# Patient Record
Sex: FEMALE | Race: WHITE | NOT HISPANIC OR LATINO | Employment: FULL TIME | URBAN - METROPOLITAN AREA
[De-identification: names, ages, dates, MRNs, and addresses within clinical notes are randomized per-mention and may not be internally consistent; named-entity substitution may affect disease eponyms.]

---
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
## 2018-01-05 ENCOUNTER — TRANSFERRED RECORDS (OUTPATIENT)
Dept: HEALTH INFORMATION MANAGEMENT | Facility: CLINIC | Age: 18
End: 2018-01-05

## 2018-01-17 ENCOUNTER — CARE COORDINATION (OUTPATIENT)
Dept: GASTROENTEROLOGY | Facility: CLINIC | Age: 18
End: 2018-01-17

## 2018-01-17 NOTE — PROGRESS NOTES
"Care Coordination Telephone Call  GI Service and Surgical Oncology    Called patient's mother to discuss timing of MRI and she is due in February for f/u but will plan for March during her spring break from college.     She also relates that prior to Christmas Dahiana had lost about 7 pounds, but now has gained them back.  Has been having intermittent loose stools and stomach \"pain\" but has not seen blood in stools and she does have otherwise normal stools.  I told her I would discuss with Dr. Rojo and if he would like her to be seen sooner I will let them know.      Her mom thinks maybe the changes are \"just college life.\"    I have asked them to call with any additional questions or concerns and have provided my contact information.    Priti GARCIAN, HNBC, STAR-T  RN Care Coordinator  Surgical Oncology and GI service  Ph: [phone]  FAX: [phone]          "

## 2018-01-18 ENCOUNTER — CARE COORDINATION (OUTPATIENT)
Dept: GASTROENTEROLOGY | Facility: CLINIC | Age: 18
End: 2018-01-18

## 2018-03-03 ENCOUNTER — RADIANT APPOINTMENT (OUTPATIENT)
Dept: MRI IMAGING | Facility: CLINIC | Age: 18
End: 2018-03-03
Attending: INTERNAL MEDICINE
Payer: COMMERCIAL

## 2018-03-03 DIAGNOSIS — R16.0 LIVER MASS: ICD-10-CM

## 2018-03-03 NOTE — DISCHARGE INSTRUCTIONS
MRI Contrast Discharge Instructions    The IV contrast you received today will pass out of your body in your  urine. This will happen in the next 24 hours. You will not feel this process.  Your urine will not change color.    Drink at least 4 extra glasses of water or juice today (unless your doctor  has restricted your fluids). This reduces the stress on your kidneys.  You may take your regular medicines.    If you are on dialysis: It is best to have dialysis today.    If you have a reaction: Most reactions happen right away. If you have  any new symptoms after leaving the hospital (such as hives or swelling),  call your hospital at the correct number below. Or call your family doctor.  If you have breathing distress or wheezing, call 911.    Special instructions: ***    I have read and understand the above information.    Signature:______________________________________ Date:___________    Staff:__________________________________________ Date:___________     Time:__________    [place] Radiology Departments:    ___[place]: [phone]  ___[facility]: [phone]  ___[place]: [phone] ___[facility]: [phone]  ___[facility]: [phone]  ___[facility]: [phone]  ___[phone]  ___[facility]: [phone]  ___[phone]

## 2018-03-03 NOTE — LETTER
Patient:  Dahiana Puentes  :   2000  MRN:     [id number]        Ms.Emily Puentes  [address] [address]        2018    Dear ,    We are writing to inform you of your test results. These are the official results from your recent MRI.    I have reviewed this with several colleagues and radiologists. There are two issues:  1) We believe that the lesion is by far most likely to represent focal nodular hyperplasia (FNH). This is an unimportant lesion in the liver that would not require stopping of your oral contraceptives. This would not require specific further imaging.  2) The new MRI report says that the lesion is stable in size, however the measurement reported in the note is different than last time. I have reviewed this specifically and the lesion has not grown.    We will plan on getting the repeat MRI in 1 year to look at your pancreas. This will also check on the liver lesion just in case. You can continue the oral contraceptives.    Please feel free to call if you have any questions about this letter. I can be reached at [phone].    SOFIA Rojo MD  Associate Professor of Medicine  Division of Gastroenterology, Hepatology and Nutrition  [facility]      Resulted Orders   MR Abdomen w/o & w Contrast    Narrative    MRI ABDOMEN    CLINICAL HISTORY:  Liver mass    TECHNIQUE:  Images were acquired with and without intravenous contrast  through the abdomen. The following MR images were acquired: TrueFISP,  multiplanar T2 weighted, axial T1 in/out of phase, axial fat-saturated  T1, diffusion-weighted. Multiplanar T1-weighted images with fat  saturation were before contrast administration and at multiple time  points following the administration of intravenous contrast. Contrast  dose: 10mL Eovist    FINDINGS:    Comparison study: MRI 2017; CT exam 2016.    Liver: Noncirrhotic configuration. No significant hepatic steatosis or  iron  deposition. No new hepatic lesion.    Stable size and appearance of a poorly defined 2.4 x 2.0 cm lesion in  the central right hepatic lobe, with mild central T2 hyperintensity,  slightly hypointense T1 signal. This lesion demonstrates arterial  enhancement, series 8 image 25 which becomes isointense on portal  venous and delayed phases. No washout or pseudocapsule examination.  Very mild hyperintense signal on DWI. The lesion mildly  hyperintense/isointense during the hepatobiliary phase (series 22 and  23, image 28), indicating a mild degree of uptake. No intralesional  fat content.    Gallbladder: Within normal limits.    Spleen: Not enlarged. No focal lesions.    Kidneys: Rotational anomaly of the right kidney. No suspicious renal  lesions. No hydronephrosis.    Adrenal glands: Within normal limits.    Pancreas: Preserved increased precontrast T1 signal. Main pancreatic  duct is not dilated. Stable cystic lesion measuring 1.3 x 0.9 cm  (axial plane) in the pancreatic head posteriorly without suspicious  features such as septations, enhancement or solid component.  Of note,  this lesion measured 1.0 x 0.7 cm on CT 1/13/2016  Bowel: No dilated loops of bowel.    Lymph nodes: Subcentimeter retroperitoneal and mesenteric lymph nodes,  not enlarged by size criteria.    Blood vessels: No abdominal aortic aneurysm. Patent abdominal  vasculature.    Lung bases: Mild bilateral lower lobe dependent atelectasis. No  pleural effusions.    Bones and soft tissues: No suspicious bone lesions. Benign-appearing  hemangioma at the vertebral body T12.    Mesentery and abdominal wall: Within normal limits.    Ascites: No ascites.      Impression    IMPRESSION:  1. Poorly defined early enhancing lesion in the right hepatic lobe is  stable in size and shows faint uptake of contrast during the  hepatobiliary phase. Findings are most in keeping with the diagnosis  of focal nodular hyperplasia.  2. Cystic lesion at the pancreatic head  is stable when compared to  prior MRI/MRCP. However, it has slightly increased in size when  compared to prior CT performed in 2016. There are no other concerning  features and this probably represents a side branch IPMN. Follow-up  criteria below.    [facility] recommendations for asymptomatic pancreatic  cysts, modified from international consensus guidelines*   Size of largest cyst:   Less than 1 cm: Follow-up imaging in 6 months to 1 year, then lengthen  interval to 2-3 years if no change.  1-2 cm: Follow-up imaging at 6 months, then yearly for 2 years, then  lengthen interval if no change.   The optimal initial study or first follow-up exam is MRI/MRCP  performed with intravenous gadolinium contrast to allow full cyst  characterization. Once characterized, contrast is optional on  follow-up MRIs. If MRI is contraindicated, CT with contrast is  recommended.   GI consultation for possible endoscopic ultrasound is recommended for  cysts with the following features: Size > 2 cm, >20% growth on  followup, wall thickening or enhancement, mural nodule, duct > 5 mm,  or abrupt change in duct caliber with distal atrophy. GI or surgical  consultation is also recommended for symptomatic cysts.   *Reference: International Consensus Guidelines for Management of IPMN  and MCN of the pancreas. Pancreatology: 12:(2012); 183-197.    TOSHA WILHELM MD

## 2018-03-05 ENCOUNTER — OFFICE VISIT (OUTPATIENT)
Dept: GASTROENTEROLOGY | Facility: CLINIC | Age: 18
End: 2018-03-05
Attending: INTERNAL MEDICINE
Payer: COMMERCIAL

## 2018-03-05 VITALS
DIASTOLIC BLOOD PRESSURE: 70 MMHG | WEIGHT: 118.17 LBS | SYSTOLIC BLOOD PRESSURE: 119 MMHG | BODY MASS INDEX: 19.08 KG/M2 | TEMPERATURE: 99 F | OXYGEN SATURATION: 100 % | HEART RATE: 97 BPM | RESPIRATION RATE: 16 BRPM

## 2018-03-05 DIAGNOSIS — R19.5 LOOSE STOOLS: ICD-10-CM

## 2018-03-05 DIAGNOSIS — K86.2 PANCREAS CYST: Primary | ICD-10-CM

## 2018-03-05 DIAGNOSIS — R16.0 LIVER MASS: ICD-10-CM

## 2018-03-05 PROCEDURE — G0463 HOSPITAL OUTPT CLINIC VISIT: HCPCS | Mod: ZF

## 2018-03-05 ASSESSMENT — PAIN SCALES - GENERAL: PAINLEVEL: NO PAIN (0)

## 2018-03-05 NOTE — PROGRESS NOTES
SUBJECTIVE:  The patient is a 17-year-old female who is being seen today for followup of a cystic lesion in the pancreas, as well as an incidentally identified liver lesion of unknown significance.  The patient initially was found to have a cyst in her pancreas during evaluation for urologic issues which have since resolved.  She was referred to me for endoscopic ultrasound examination, which was performed by myself on 06/09/2016.  At that time she was found to have a 16 x 6 mm cyst in the head of the pancreas.  Needle aspiration was performed with a low CEA level of 2.5, amylase of 898 and negative cytology.  There were no high-risk features seen on the endoscopic ultrasound.  Subsequently, she had an MRI obtained in 08/2017.  At that time, the cyst was felt to be stable; however, incidentally, she was found to have a nonspecific lesion in the central portion of her liver which was thought to either be a hepatic adenoma or focal nodular hyperplasia or perhaps some inflammation or scarring.  She was presented at our multidisciplinary liver tumor conference, and the recommendation at that time was to have a repeat MRI with [facility].  She does take oral contraceptives, and it was felt that she could continue these.      She had an MRI with [facility] performed 2 days ago and is here to review the results.  MRI has not been officially read, but I did review with Radiology today and it is their opinion that this is by far most likely to represent focal nodular hyperplasia.  This will need to be further reviewed before final report is issued, but the thought was that this was unlikely to be a hepatic adenoma.  If it is an adenoma, it has not changed over time while the patient has continued oral contraceptives.  The pancreatic cyst also appears stable.      Again, past medical history is significant for acne and seasonal allergies.      The patient reports today that she has intermittent gastrointestinal symptoms.  She is a  "freshman in college and states that she gets abdominal bloating and loose stools during times of stress.  She does not notice blood in the stools or obvious undigested food or fat.  Her weight has been stable.  She notes that she has bloating and flatulence in response to dairy and has been able to prevent this at times by using Lactaid.  She does report she has camped intermittently and does not recall drinking stream water.  These symptoms have been present for 2-3 years or so.      OBJECTIVE:   VITAL SIGNS:  Documented in Epic.   GENERAL:  The patient is awake, alert, in no acute distress.   HEENT:  Shows no evidence of scleral icterus.   ABDOMEN:  Soft and benign.  There is no focal mass, tenderness or hepatosplenomegaly.      ASSESSMENT AND PLAN:     1.  Pancreatic cyst.  Again, this is nonspecific with reassuring imaging and low CEA; however, I cannot completely exclude the possibility that this represents either a mucinous cystic neoplasm or branch duct variant intraductal papillary mucinous neoplasm.  We again discussed the published guidelines for management.  My recommendation is that we follow this with a 1-year repeat MRI with contrast.  If that is stable at that time, guidelines would suggest a followup exam in 3 years.  I again reviewed the potential risk for malignant transformation with this lesion, but this is relatively low.  I again counseled that I would recommend that we delay more invasive evaluation at this point.  It is likely that we will have ongoing research with either improved diagnostic techniques or ablative techniques which would not require a Whipple resection which otherwise what would be required at this time.  When I see her in 1 year, we will discuss potentially performing repeat EUS with fluid analysis including Redpath tumor marker panel and possibly forceps biopsy with \"Moray\" forceps to obtain a more definitive diagnosis.   2.  Liver lesion.  If this is in fact focal nodular " hyperplasia, then it has been stable, and no further evaluation is warranted.  I think it is certainly reasonable for her to continue her oral contraceptives at this time.  I will contact her with the final MRI report and arrange for any further evaluation if indicated at that time.  At present, I anticipate no further specific followup of this lesion.   3.  Gastrointestinal symptoms.  This is likely irritable bowel syndrome, diarrhea predominant.  However, it would be reasonable to check fecal elastase to exclude pancreatic exocrine insufficiency given her pancreatic cyst.  I will also check a Giardia specific antigen.  She reports having had recent testing for celiac disease, which was normal.  We will obtain these results.  She also reports negative H. pylori testing.  We discussed dietary management of irritable bowel syndrome.  We also discussed the clinical significance and management of lactose intolerance.      We will try to synch up her followup MRIs in a schedule that would be more convenient to her over her winter break from college.  I will schedule her repeat MRI in early January.

## 2018-03-05 NOTE — LETTER
3/5/2018       RE: Dahiana Puentes  [address] [facility] [address] [address]     Dear Colleague,    Thank you for referring your patient, Dahiana Puentes, to the [facility] at [facility]. Please see a copy of my visit note below.    SUBJECTIVE:  The patient is a 17-year-old female who is being seen today for followup of a cystic lesion in the pancreas, as well as an incidentally identified liver lesion of unknown significance.  The patient initially was found to have a cyst in her pancreas during evaluation for urologic issues which have since resolved.  She was referred to me for endoscopic ultrasound examination, which was performed by myself on 06/09/2016.  At that time she was found to have a 16 x 6 mm cyst in the head of the pancreas.  Needle aspiration was performed with a low CEA level of 2.5, amylase of 898 and negative cytology.  There were no high-risk features seen on the endoscopic ultrasound.  Subsequently, she had an MRI obtained in 08/2017.  At that time, the cyst was felt to be stable; however, incidentally, she was found to have a nonspecific lesion in the central portion of her liver which was thought to either be a hepatic adenoma or focal nodular hyperplasia or perhaps some inflammation or scarring.  She was presented at our multidisciplinary liver tumor conference, and the recommendation at that time was to have a repeat MRI with [facility].  She does take oral contraceptives, and it was felt that she could continue these.      She had an MRI with [facility] performed 2 days ago and is here to review the results.  MRI has not been officially read, but I did review with Radiology today and it is their opinion that this is by far most likely to represent focal nodular hyperplasia.  This will need to be further reviewed before final report is issued, but the thought was that this was unlikely to be a hepatic adenoma.  If it is an adenoma, it has not  changed over time while the patient has continued oral contraceptives.  The pancreatic cyst also appears stable.      Again, past medical history is significant for acne and seasonal allergies.      The patient reports today that she has intermittent gastrointestinal symptoms.  She is a freshman in college and states that she gets abdominal bloating and loose stools during times of stress.  She does not notice blood in the stools or obvious undigested food or fat.  Her weight has been stable.  She notes that she has bloating and flatulence in response to dairy and has been able to prevent this at times by using Lactaid.  She does report she has camped intermittently and does not recall drinking stream water.  These symptoms have been present for 2-3 years or so.      OBJECTIVE:   VITAL SIGNS:  Documented in Epic.   GENERAL:  The patient is awake, alert, in no acute distress.   HEENT:  Shows no evidence of scleral icterus.   ABDOMEN:  Soft and benign.  There is no focal mass, tenderness or hepatosplenomegaly.      ASSESSMENT AND PLAN:     1.  Pancreatic cyst.  Again, this is nonspecific with reassuring imaging and low CEA; however, I cannot completely exclude the possibility that this represents either a mucinous cystic neoplasm or branch duct variant intraductal papillary mucinous neoplasm.  We again discussed the published guidelines for management.  My recommendation is that we follow this with a 1-year repeat MRI with contrast.  If that is stable at that time, guidelines would suggest a followup exam in 3 years.  I again reviewed the potential risk for malignant transformation with this lesion, but this is relatively low.  I again counseled that I would recommend that we delay more invasive evaluation at this point.  It is likely that we will have ongoing research with either improved diagnostic techniques or ablative techniques which would not require a Whipple resection which otherwise what would be required at  "this time.  When I see her in 1 year, we will discuss potentially performing repeat EUS with fluid analysis including Redpath tumor marker panel and possibly forceps biopsy with \"Moray\" forceps to obtain a more definitive diagnosis.   2.  Liver lesion.  If this is in fact focal nodular hyperplasia, then it has been stable, and no further evaluation is warranted.  I think it is certainly reasonable for her to continue her oral contraceptives at this time.  I will contact her with the final MRI report and arrange for any further evaluation if indicated at that time.  At present, I anticipate no further specific followup of this lesion.   3.  Gastrointestinal symptoms.  This is likely irritable bowel syndrome, diarrhea predominant.  However, it would be reasonable to check fecal elastase to exclude pancreatic exocrine insufficiency given her pancreatic cyst.  I will also check a Giardia specific antigen.  She reports having had recent testing for celiac disease, which was normal.  We will obtain these results.  She also reports negative H. pylori testing.  We discussed dietary management of irritable bowel syndrome.  We also discussed the clinical significance and management of lactose intolerance.      We will try to synch up her followup MRIs in a schedule that would be more convenient to her over her winter break from college.  I will schedule her repeat MRI in early January.     Again, thank you for allowing me to participate in the care of your patient.      Sincerely,    Raul Rojo MD      "

## 2018-03-05 NOTE — MR AVS SNAPSHOT
After Visit Summary   3/5/2018    Dahiana Puentes    MRN: [id number]           Patient Information     Date Of Birth          2000        Visit Information        Provider Department      3/5/2018 8:40 AM Raul Rojo MD [facility]        Today's Diagnoses     Pancreas cyst    -  1    Liver mass        Loose stools          Care Instructions    You will find a brief summary of your discussion and care plan from today's visit below.  Please review this information with your primary care provider.  ______________________________________________________________________    As discussed today by Dr. Rojo, we will plan the followin.  Stop at the lab today to  stool testing kit.  You can return this to any [facility] lab.     2.  Plan for repeat MRI and clinic visit with Dr. Rojo in early January.  Please call our office to arrange.      ______________________________________________________________________    It was a pleasure seeing you in clinic today - please contact us if there are any further questions about upcoming appointments that arise following today's visit.  During business hours, you may reach the Clinic Coordinator at [phone].  For urgent/emergent questions after business hours, you may reach the on-call GI Fellow by contacting the [facility]  at [phone].    Any benign/non-urgent test results are usually communicated via letter or [employer] [facility] message within 1-2 weeks after completion.  Urgent results (those that require a change in the previously-discussed care plan) are usually communicated via a phone call once available from our clinic staff to discuss the results and the next steps in your evaluation.    I recommend signing up for [employer] access if you have not already done so and are comfortable with using a computer.  This allows for online access to your lab results and also helps you communicate efficiently  with the clinic should any questions arise in your care.    My role as your RN care coordinator is to assist with any additional questions or concerns regarding your diagnosis and plan of care and to be your advocate.  I am happy to be part of your care team at the [facility].      Sincerely,      Priti Erickson  BSN, HNBC, STAR-T  RN Care Coordinator  Ph: [phone]  FAX: [phone]              Follow-ups after your visit        Follow-up notes from your care team     Return in about 9 months (around 12/5/2018) for for review of repeat MRI.      Your next 10 appointments already scheduled     Aug 20, 2018 10:00 AM CDT   (Arrive by 9:45 AM)   Return Visit with Raul Rojo MD   [facility] ([facility])    [address] [place] [id number] [phone]              Future tests that were ordered for you today     Open Future Orders        Priority Expected Expires Ordered    Pancreatic Elastase Fecal Routine  3/5/2019 3/5/2018    Giardia antigen Routine  3/5/2019 3/5/2018            Who to contact     If you have questions or need follow up information about today's clinic visit or your schedule please contact [facility] directly at [phone].  Normal or non-critical lab and imaging results will be communicated to you by MyChart, letter or phone within 4 business days after the clinic has received the results. If you do not hear from us within 7 days, please contact the clinic through MyChart or phone. If you have a critical or abnormal lab result, we will notify you by phone as soon as possible.  Submit refill requests through [employer] or call your pharmacy and they will forward the refill request to us. Please allow 3 business days for your refill to be completed.          Additional Information About Your Visit        [employer] Information     [employer] lets you send messages to your doctor, view  "your test results, renew your prescriptions, schedule appointments and more. To sign up, go to [email or web address] . Click on \"Log in\" on the left side of the screen, which will take you to the Welcome page. Then click on \"Sign up Now\" on the right side of the page.     You will be asked to enter the access code listed below, as well as some personal information. Please follow the directions to create your username and password.     Your access code is: [email or web address]  Expires: 2018  3:43 PM     Your access code will  in 90 days. If you need help or a new code, please call your [facility] clinic or [phone].        Care EveryWhere ID     This is your Care EveryWhere ID. This could be used by other organizations to access your [facility] medical records  [id number]        Your Vitals Were     Pulse Temperature Respirations Pulse Oximetry BMI (Body Mass Index)       97 99  F (37.2  C) (Tympanic) 16 100% 19.08 kg/m2        Blood Pressure from Last 3 Encounters:   18 119/70   17 122/76   16 108/57    Weight from Last 3 Encounters:   18 53.6 kg (118 lb 2.7 oz) (31 %)*   17 53.6 kg (118 lb 1.6 oz) (33 %)*   16 55.3 kg (121 lb 14.6 oz) (47 %)*     * Growth percentiles are based on [facility] 2-20 Years data.                 Today's Medication Changes          These changes are accurate as of 3/5/18  9:20 AM.  If you have any questions, ask your nurse or doctor.               Stop taking these medicines if you haven't already. Please contact your care team if you have questions.     ACZONE 5 % Gel   Generic drug:  Dapsone   Stopped by:  Raul Rojo MD           cephALEXin 500 MG ED starter pack   Commonly known as:  KEFLEX   Stopped by:  Raul Rojo MD                    Primary Care Provider Office Phone # Fax #    Bryn V MD Tim [phone]       [facility] [id number] [facility] [address]  [place] [address]        Equal Access to Services  "    SAM SHIRLEY : Hadii aad ku tori House, waaxda luqadaha, qaybta kaalmada haley, waxsylvia félix hayisabelle weekshectoravtar topete . So [facility] [phone].    ATENCIÓN: Si habla español, tiene a kyle disposición servicios gratuitos de asistencia lingüística. Llame al [phone].    We comply with applicable federal civil rights laws and [place] laws. We do not discriminate on the basis of race, color, national origin, age, disability, sex, sexual orientation, or gender identity.            Thank you!     Thank you for choosing [facility]  for your care. Our goal is always to provide you with excellent care. Hearing back from our patients is one way we can continue to improve our services. Please take a few minutes to complete the written survey that you may receive in the mail after your visit with us. Thank you!             Your Updated Medication List - Protect others around you: Learn how to safely use, store and throw away your medicines at www.disposemymeds.org.          This list is accurate as of 3/5/18  9:20 AM.  Always use your most recent med list.                   Brand Name Dispense Instructions for use Diagnosis    clindamycin 1 % topical gel    CLINDAMAX     Apply topically 2 times daily        levofloxacin 500 MG tablet    LEVAQUIN    5 tablet    Take 1 tablet (500 mg) by mouth daily    Pancreas cyst       spironolactone 25 MG tablet    ALDACTONE     Take 25 mg by mouth 2 times daily        vitamin D 2000 UNITS Caps      Take 2,000 Units by mouth daily        TAMIA PO      1 tablet Per instructions provided        ZYRTEC ALLERGY 10 MG tablet   Generic drug:  cetirizine      Take 10 mg by mouth as needed

## 2018-03-05 NOTE — PATIENT INSTRUCTIONS
You will find a brief summary of your discussion and care plan from today's visit below.  Please review this information with your primary care provider.  ______________________________________________________________________    As discussed today by Dr. Rojo, we will plan the followin.  Stop at the lab today to  stool testing kit.  You can return this to any [facility] lab.     2.  Plan for repeat MRI and clinic visit with Dr. Rojo in early January.  Please call our office to arrange.      ______________________________________________________________________    It was a pleasure seeing you in clinic today - please contact us if there are any further questions about upcoming appointments that arise following today's visit.  During business hours, you may reach the Clinic Coordinator at [phone].  For urgent/emergent questions after business hours, you may reach the on-call GI Fellow by contacting the [facility]  at [phone].    Any benign/non-urgent test results are usually communicated via letter or [employer] [facility] message within 1-2 weeks after completion.  Urgent results (those that require a change in the previously-discussed care plan) are usually communicated via a phone call once available from our clinic staff to discuss the results and the next steps in your evaluation.    I recommend signing up for [employer] access if you have not already done so and are comfortable with using a computer.  This allows for online access to your lab results and also helps you communicate efficiently with the clinic should any questions arise in your care.    My role as your RN care coordinator is to assist with any additional questions or concerns regarding your diagnosis and plan of care and to be your advocate.  I am happy to be part of your care team at the [facility].      Sincerely,      Priti Erickson  BSN, HNBC, STAR-T  RN Care Coordinator  Ph: [phone]  FAX:  [phone]

## 2018-03-05 NOTE — NURSING NOTE
"Oncology Rooming Note    March 5, 2018 8:36 AM   Dahiana Puentes is a 17 year old female who presents for:    Chief Complaint   Patient presents with     Oncology Clinic Visit     Return for Pancreatic Cyst     Initial Vitals: /70 (BP Location: Left arm, Patient Position: Sitting, Cuff Size: Adult Large)  Pulse 97  Temp 99  F (37.2  C) (Tympanic)  Resp 16  Wt 53.6 kg (118 lb 2.7 oz)  SpO2 100%  BMI 19.08 kg/m2 Estimated body mass index is 19.08 kg/(m^2) as calculated from the following:    Height as of 8/7/17: 1.676 m (5' 5.98\").    Weight as of this encounter: 53.6 kg (118 lb 2.7 oz). Body surface area is 1.58 meters squared.  No Pain (0) Comment: Data Unavailable   No LMP recorded.  Allergies reviewed: Yes  Medications reviewed: Yes    Medications: Medication refills not needed today.  Pharmacy name entered into EPIC: Data Unavailable    Clinical concerns: MRI results  Darrel was notified.    9  minutes for nursing intake (face to face time)     Michelle Esparza MA              "

## 2018-03-06 PROCEDURE — 99000 SPECIMEN HANDLING OFFICE-LAB: CPT | Performed by: INTERNAL MEDICINE

## 2018-03-06 PROCEDURE — 83520 IMMUNOASSAY QUANT NOS NONAB: CPT | Mod: 90 | Performed by: INTERNAL MEDICINE

## 2018-03-06 PROCEDURE — 87329 GIARDIA AG IA: CPT | Performed by: INTERNAL MEDICINE

## 2018-03-07 DIAGNOSIS — R19.5 LOOSE STOOLS: ICD-10-CM

## 2018-03-07 DIAGNOSIS — K86.2 PANCREAS CYST: ICD-10-CM

## 2018-03-08 LAB
G LAMBLIA AG STL QL IA: NORMAL
SPECIMEN SOURCE: NORMAL

## 2018-03-09 LAB — ELASTASE PANC STL-MCNT: >500 UG/G

## 2018-11-14 ENCOUNTER — CARE COORDINATION (OUTPATIENT)
Dept: GASTROENTEROLOGY | Facility: CLINIC | Age: 18
End: 2018-11-14

## 2018-11-14 NOTE — PROGRESS NOTES
"Care Coordination Telephone Call  GI Service and Surgical Oncology    Patient called to discuss ongoing issues with \"stomach\" and has asked for Dr. Rojo's advise.  She also has left permission on the voice mail to contact her mother with f/u planning as \"she is very busy with college.\" I have spoken with her mom and issues remain the same with diarrhea that seems to be getting worse.  She had been taking Prilosec for what she described as 'reflux\" issues that seemed to \"not really provide relief.\"  She has tried not having lactose and that didn't help.   She did see Endocrinology in [place] and all tests for gluten intolerance were normal.  Changes in diet \"don't seem to make a difference.\"  Mom describes diarrhea about 3-5 times per week, with loose stools up to 5 times per day.  Has not noted any blood in the stools, varies in color.    Will discuss with Dr. Rojo   I have asked the patient and Mom to call with any additional questions or concerns and have provided my contact information    Priti GARCIAN, HNBC, STAR-T  RN Care Coordinator  Surgical Oncology and GI service  Ph: [phone]  FAX: [phone]          "

## 2018-11-15 ENCOUNTER — CARE COORDINATION (OUTPATIENT)
Dept: GASTROENTEROLOGY | Facility: CLINIC | Age: 18
End: 2018-11-15

## 2018-11-15 NOTE — PROGRESS NOTES
Care Coordination Telephone Call  GI Service and Surgical Oncology    Called patient's mother to discuss questions below from Dr. Rojo in f/u to yesterday's phone conversation:    Please confirm no blood and no fevers. See if any recent antibiotics since last visit, and if so check C dif.     Could try metamucil 1 tbsp po daily while waiting for further evaluation. This can provide bulk and bind extra water.     11/15/18 2:30 pm call returned by mother and she has spoken with Dahiana, denies blood in stool, has not been taking antibiotics since last visit with Dr. Rojo, and temp max was 99.5 po.  I have instructed Metamucil as above and we will plan to move appointment with Dr. Rojo up to December 17th at 8 am with MRI prior.    Priti Erickson  BSN, HNBC, STAR-T  RN Care Coordinator  Surgical Oncology and GI service  Ph: [phone]  FAX: [phone]

## 2018-11-15 NOTE — TELEPHONE ENCOUNTER
Patient has been scheduled for 12/13  11:00 MRI at [address] [place] [address] St and Dr Rojo 12/17 at 8:00.  I call and left message with Mom.

## 2018-12-12 ENCOUNTER — HOSPITAL ENCOUNTER (OUTPATIENT)
Dept: MRI IMAGING | Facility: CLINIC | Age: 18
Discharge: HOME | End: 2018-12-12
Attending: INTERNAL MEDICINE | Admitting: INTERNAL MEDICINE
Payer: COMMERCIAL

## 2018-12-12 DIAGNOSIS — K86.2 PANCREAS CYST: ICD-10-CM

## 2018-12-12 PROCEDURE — 74183 MRI ABD W/O CNTR FLWD CNTR: CPT

## 2018-12-12 PROCEDURE — 25500064 ZZH RX 255 OP 636: Performed by: INTERNAL MEDICINE

## 2018-12-12 PROCEDURE — A9585 GADOBUTROL INJECTION: HCPCS | Performed by: INTERNAL MEDICINE

## 2018-12-12 RX ORDER — GADOBUTROL 604.72 MG/ML
7.5 INJECTION INTRAVENOUS ONCE
Status: COMPLETED | OUTPATIENT
Start: 2018-12-12 | End: 2018-12-12

## 2018-12-12 RX ADMIN — GADOBUTROL 5 ML: 604.72 INJECTION INTRAVENOUS at 12:09

## 2018-12-17 ENCOUNTER — TELEPHONE (OUTPATIENT)
Dept: GASTROENTEROLOGY | Facility: CLINIC | Age: 18
End: 2018-12-17

## 2018-12-17 ENCOUNTER — OFFICE VISIT (OUTPATIENT)
Dept: GASTROENTEROLOGY | Facility: CLINIC | Age: 18
End: 2018-12-17
Attending: INTERNAL MEDICINE
Payer: COMMERCIAL

## 2018-12-17 VITALS
HEIGHT: 66 IN | RESPIRATION RATE: 14 BRPM | TEMPERATURE: 98.6 F | DIASTOLIC BLOOD PRESSURE: 71 MMHG | SYSTOLIC BLOOD PRESSURE: 112 MMHG | WEIGHT: 116 LBS | HEART RATE: 80 BPM | BODY MASS INDEX: 18.64 KG/M2 | OXYGEN SATURATION: 100 %

## 2018-12-17 DIAGNOSIS — R16.0 LIVER MASS: ICD-10-CM

## 2018-12-17 DIAGNOSIS — R19.5 LOOSE STOOLS: ICD-10-CM

## 2018-12-17 DIAGNOSIS — K86.2 PANCREAS CYST: Primary | ICD-10-CM

## 2018-12-17 LAB
ALBUMIN SERPL-MCNC: 3.8 G/DL (ref 3.4–5)
BASOPHILS # BLD AUTO: 0.1 10E9/L (ref 0–0.2)
BASOPHILS NFR BLD AUTO: 0.7 %
DIFFERENTIAL METHOD BLD: NORMAL
EOSINOPHIL # BLD AUTO: 0.1 10E9/L (ref 0–0.7)
EOSINOPHIL NFR BLD AUTO: 0.7 %
ERYTHROCYTE [DISTWIDTH] IN BLOOD BY AUTOMATED COUNT: 11.3 % (ref 10–15)
ERYTHROCYTE [SEDIMENTATION RATE] IN BLOOD BY WESTERGREN METHOD: 5 MM/H (ref 0–20)
HCT VFR BLD AUTO: 41.8 % (ref 35–47)
HGB BLD-MCNC: 14.1 G/DL (ref 11.7–15.7)
IGA SERPL-MCNC: 117 MG/DL (ref 70–380)
IMM GRANULOCYTES # BLD: 0 10E9/L (ref 0–0.4)
IMM GRANULOCYTES NFR BLD: 0.4 %
LYMPHOCYTES # BLD AUTO: 2.5 10E9/L (ref 0.8–5.3)
LYMPHOCYTES NFR BLD AUTO: 32.7 %
MCH RBC QN AUTO: 30.4 PG (ref 26.5–33)
MCHC RBC AUTO-ENTMCNC: 33.7 G/DL (ref 31.5–36.5)
MCV RBC AUTO: 90 FL (ref 78–100)
MONOCYTES # BLD AUTO: 0.4 10E9/L (ref 0–1.3)
MONOCYTES NFR BLD AUTO: 5.5 %
NEUTROPHILS # BLD AUTO: 4.6 10E9/L (ref 1.6–8.3)
NEUTROPHILS NFR BLD AUTO: 60 %
NRBC # BLD AUTO: 0 10*3/UL
NRBC BLD AUTO-RTO: 0 /100
PLATELET # BLD AUTO: 337 10E9/L (ref 150–450)
RBC # BLD AUTO: 4.64 10E12/L (ref 3.8–5.2)
TSH SERPL DL<=0.005 MIU/L-ACNC: 2.38 MU/L (ref 0.4–4)
WBC # BLD AUTO: 7.7 10E9/L (ref 4–11)

## 2018-12-17 PROCEDURE — G0463 HOSPITAL OUTPT CLINIC VISIT: HCPCS | Mod: ZF

## 2018-12-17 PROCEDURE — 82040 ASSAY OF SERUM ALBUMIN: CPT | Performed by: INTERNAL MEDICINE

## 2018-12-17 PROCEDURE — 84443 ASSAY THYROID STIM HORMONE: CPT | Performed by: INTERNAL MEDICINE

## 2018-12-17 PROCEDURE — 83516 IMMUNOASSAY NONANTIBODY: CPT | Mod: 91 | Performed by: INTERNAL MEDICINE

## 2018-12-17 PROCEDURE — 85025 COMPLETE CBC W/AUTO DIFF WBC: CPT | Performed by: INTERNAL MEDICINE

## 2018-12-17 PROCEDURE — 36415 COLL VENOUS BLD VENIPUNCTURE: CPT | Performed by: INTERNAL MEDICINE

## 2018-12-17 PROCEDURE — 85652 RBC SED RATE AUTOMATED: CPT | Performed by: INTERNAL MEDICINE

## 2018-12-17 PROCEDURE — 82784 ASSAY IGA/IGD/IGG/IGM EACH: CPT | Performed by: INTERNAL MEDICINE

## 2018-12-17 PROCEDURE — 83516 IMMUNOASSAY NONANTIBODY: CPT | Performed by: INTERNAL MEDICINE

## 2018-12-17 RX ORDER — PSEUDOEPHEDRINE HCL 30 MG
TABLET ORAL EVERY 4 HOURS PRN
COMMUNITY
End: 2024-06-24

## 2018-12-17 ASSESSMENT — PAIN SCALES - GENERAL: PAINLEVEL: NO PAIN (0)

## 2018-12-17 ASSESSMENT — MIFFLIN-ST. JEOR: SCORE: 1312.92

## 2018-12-17 NOTE — NURSING NOTE
"Oncology Rooming Note    December 17, 2018 8:19 AM   Dahiana Puentes is a 18 year old female who presents for:    Chief Complaint   Patient presents with     Oncology Clinic Visit     Return Pancreatic Cyst     Initial Vitals: /71   Pulse 80   Temp 98.6  F (37  C) (Oral)   Resp 14   Ht 1.676 m (5' 6\")   Wt 52.6 kg (116 lb)   LMP 12/03/2018 (Approximate)   SpO2 100%   Breastfeeding? No   BMI 18.72 kg/m   Estimated body mass index is 18.72 kg/m  as calculated from the following:    Height as of this encounter: 1.676 m (5' 6\").    Weight as of this encounter: 52.6 kg (116 lb). Body surface area is 1.57 meters squared.  No Pain (0) Comment: Data Unavailable   Patient's last menstrual period was 12/03/2018 (approximate).  Allergies reviewed: Yes  Medications reviewed: Yes    Medications: Medication refills not needed today.  Pharmacy name entered into [facility]: [facility] - [address]    Clinical concerns: Having GI issues; possible IBS or an autoimmune issue.     6 minutes for nursing intake (face to face time)     Nadya Edwards CMA              "

## 2018-12-17 NOTE — PROGRESS NOTES
SUBJECTIVE:  The patient is a 18-year-old white female who I am seeing today for followup of an incidentally identified cystic lesion in the pancreas and liver lesion.  The pancreatic cyst was incidentally identified on abdominal imaging during an evaluation for urologic symptoms.  Subsequently, she was referred and underwent endoscopic ultrasound by myself on 06/09/2016.  This showed a single 16x6 mm cystic lesion in the pancreatic head, which was simple in appearance.  There were no solid components and there were no features of chronic pancreatitis.  Needle aspiration was performed and CEA level in the cyst fluid was 2.5 with a relatively low amylase level of 898 and benign nondiagnostic cytology.  Since that time she has undergone surveillance imaging for the possible diagnosis of intraductal papillary mucinous neoplasm or other neoplastic cysts.  She had an MRI of the abdomen in 08/2017, which showed that the cyst was stable but incidentally identified a lesion in the liver.  It was unclear on imaging whether this could represent a hepatic adenoma or focal nodular hyperplasia.  Her case was discussed in a multidisciplinary liver conference and recommendation made for MRI with Eovist.  This was performed on 03/03/2018 and at that time the lesion was felt to be most consistent with focal nodular hyperplasia.  She was taking oral contraceptives and it was felt that this was safe to continue.      She is here today for followup of a recent MRI for surveillance of the cyst and liver lesion.  This was performed on 12/12/2018.  This reported that the pancreatic cyst is unchanged in appearance.  She was incidentally noted to have pancreas divisum.  There was noted to be decreased conspicuity of the liver lesion.      At her time of her last visit, she was complaining of abdominal bloating and loose stools.  At that time we did check a stool for Giardia specific antigen as well as fecal elastase and these were normal.   "She continues to complain of these issues and states it is significantly interfering with her quality of life.  She describes episodes of loose stools, perhaps once or twice a week with 1-2 loose stools per day.  There is no blood in the stools.  She describes abdominal cramping and \"gassiness\" but does not have significant increased flatulence.  She has tried multiple dietary changes including a prolonged period of gluten-free diet without improvement.  She currently is avoiding greasy foods.  She is not having documented fevers.  She has tried Metamucil for approximately a week and a half without change in symptoms.  She feels slightly improved with Pepto-Bismol.      In addition to these symptoms she complains of dry hands and reported Raynaud's phenomenon.  She has intermittent headaches and suffers from anxiety.  She brought printed literature today regarding possible autoimmune inflammatory conditions of the GI tract and is concerned that this is somehow autoimmune.  Her mother has Raynaud phenomenon but otherwise there is no family history of celiac disease, ulcerative colitis, Crohn's disease or other autoimmune disorders.      The patient by report had serologic testing for celiac disease many years ago, but I do not have these records.      She states that her symptoms do not seem to correlate with times of stress and she is not identifying obvious precipitating factors.      OBJECTIVE:  Vital signs are documented in Epic.  The patient is awake, alert, in no acute distress.  Her abdomen shows normal bowel sounds, and is otherwise soft and benign.  There is no tenderness, mass, rebound, guarding or rigidity.  Skin shows no focal lesions in the hands.      ASSESSMENT AND PLAN:   1.  Pancreatic cyst.  The needle aspiration from the cyst was nondiagnostic, but potentially suggestive of a benign lesion such as a serous cystadenoma.  Given the low negative predictive value of needle aspiration to exclude " significant mucinous lesions, I did recommend that we continue surveillance imaging.  We discussed potential repeat testing including such tests as DNA analysis (RedPath testing) or Moray forceps biopsies; however, I am not sure that these are necessary.  In general, these can confirm a diagnosis of a clinically significant lesion but do not necessarily trigger surgical resection and negative testing would not definitively preclude the need for surveillance imaging.  After discussion, it was recommended she have repeat MRI of the abdomen in 3 years for further surveillance.  We will meet with her at that time and discuss any new literature or changes in published guidelines.   2.  Liver lesion.  This is felt to be most consistent with focal nodular hyperplasia and is stable on imaging.  I believe she can continue taking her oral contraceptives and this does not specifically require further surveillance, but will be followed at the time of her pancreatic cyst followup.   3.  Intermittent diarrhea, cramping and gassiness.  I suspect that this is irritable bowel syndrome.  Differential diagnosis would include celiac disease or inflammatory bowel disease.  She is concerned about autoimmune enteropathy which is also in the differential, but relatively uncommon.  Her dry skin could potentially also represent thyroid dysfunction.  My recommendation at this time would be to check labs including serology for celiac disease, thyroid stimulating hormone level and albumin.  We will check a complete blood count to exclude anemia.  I will plan on an endoscopy with small bowel biopsy.  At some point she may need more specific testing for autoimmune dysfunction; however, for now I will check a sedimentation rate to screen for obvious inflammatory bowel disease.  I would like ideally to have her meet one of my colleagues who focuses more on luminal gastrointestinal disorders and functional diseases of the GI tract and therefore we  will pursue a referral to Dr. Mckeon.  I will arrange for further evaluation if necessary based on the lab testing.  In the meantime, I suggested that she continue with her low-fat diet and that she could try Imodium 1 tablet a day or one-half tablet a day as needed to see if this will control her loose stools.

## 2018-12-17 NOTE — LETTER
12/17/2018       RE: Dahiana Puentes  [address] [facility] [facility] [address]     Dear Colleague,    Thank you for referring your patient, Dahiana Puentes, to the [facility] at [facility]. Please see a copy of my visit note below.    SUBJECTIVE:  The patient is a 18-year-old white female who I am seeing today for followup of an incidentally identified cystic lesion in the pancreas and liver lesion.  The pancreatic cyst was incidentally identified on abdominal imaging during an evaluation for urologic symptoms.  Subsequently, she was referred and underwent endoscopic ultrasound by myself on 06/09/2016.  This showed a single 16x6 mm cystic lesion in the pancreatic head, which was simple in appearance.  There were no solid components and there were no features of chronic pancreatitis.  Needle aspiration was performed and CEA level in the cyst fluid was 2.5 with a relatively low amylase level of 898 and benign nondiagnostic cytology.  Since that time she has undergone surveillance imaging for the possible diagnosis of intraductal papillary mucinous neoplasm or other neoplastic cysts.  She had an MRI of the abdomen in 08/2017, which showed that the cyst was stable but incidentally identified a lesion in the liver.  It was unclear on imaging whether this could represent a hepatic adenoma or focal nodular hyperplasia.  Her case was discussed in a multidisciplinary liver conference and recommendation made for MRI with Eovist.  This was performed on 03/03/2018 and at that time the lesion was felt to be most consistent with focal nodular hyperplasia.  She was taking oral contraceptives and it was felt that this was safe to continue.      She is here today for followup of a recent MRI for surveillance of the cyst and liver lesion.  This was performed on 12/12/2018.  This reported that the pancreatic cyst is unchanged in appearance.  She was incidentally noted to have  "pancreas divisum.  There was noted to be decreased conspicuity of the liver lesion.      At her time of her last visit, she was complaining of abdominal bloating and loose stools.  At that time we did check a stool for Giardia specific antigen as well as fecal elastase and these were normal.  She continues to complain of these issues and states it is significantly interfering with her quality of life.  She describes episodes of loose stools, perhaps once or twice a week with 1-2 loose stools per day.  There is no blood in the stools.  She describes abdominal cramping and \"gassiness\" but does not have significant increased flatulence.  She has tried multiple dietary changes including a prolonged period of gluten-free diet without improvement.  She currently is avoiding greasy foods.  She is not having documented fevers.  She has tried Metamucil for approximately a week and a half without change in symptoms.  She feels slightly improved with Pepto-Bismol.      In addition to these symptoms she complains of dry hands and reported Raynaud's phenomenon.  She has intermittent headaches and suffers from anxiety.  She brought printed literature today regarding possible autoimmune inflammatory conditions of the GI tract and is concerned that this is somehow autoimmune.  Her mother has Raynaud phenomenon but otherwise there is no family history of celiac disease, ulcerative colitis, Crohn's disease or other autoimmune disorders.      The patient by report had serologic testing for celiac disease many years ago, but I do not have these records.      She states that her symptoms do not seem to correlate with times of stress and she is not identifying obvious precipitating factors.      OBJECTIVE:  Vital signs are documented in Epic.  The patient is awake, alert, in no acute distress.  Her abdomen shows normal bowel sounds, and is otherwise soft and benign.  There is no tenderness, mass, rebound, guarding or rigidity.  Skin " shows no focal lesions in the hands.      ASSESSMENT AND PLAN:   1.  Pancreatic cyst.  The needle aspiration from the cyst was nondiagnostic, but potentially suggestive of a benign lesion such as a serous cystadenoma.  Given the low negative predictive value of needle aspiration to exclude significant mucinous lesions, I did recommend that we continue surveillance imaging.  We discussed potential repeat testing including such tests as DNA analysis (RedPath testing) or Moray forceps biopsies; however, I am not sure that these are necessary.  In general, these can confirm a diagnosis of a clinically significant lesion but do not necessarily trigger surgical resection and negative testing would not definitively preclude the need for surveillance imaging.  After discussion, it was recommended she have repeat MRI of the abdomen in 3 years for further surveillance.  We will meet with her at that time and discuss any new literature or changes in published guidelines.   2.  Liver lesion.  This is felt to be most consistent with focal nodular hyperplasia and is stable on imaging.  I believe she can continue taking her oral contraceptives and this does not specifically require further surveillance, but will be followed at the time of her pancreatic cyst followup.   3.  Intermittent diarrhea, cramping and gassiness.  I suspect that this is irritable bowel syndrome.  Differential diagnosis would include celiac disease or inflammatory bowel disease.  She is concerned about autoimmune enteropathy which is also in the differential, but relatively uncommon.  Her dry skin could potentially also represent thyroid dysfunction.  My recommendation at this time would be to check labs including serology for celiac disease, thyroid stimulating hormone level and albumin.  We will check a complete blood count to exclude anemia.  I will plan on an endoscopy with small bowel biopsy.  At some point she may need more specific testing for  autoimmune dysfunction; however, for now I will check a sedimentation rate to screen for obvious inflammatory bowel disease.  I would like ideally to have her meet one of my colleagues who focuses more on luminal gastrointestinal disorders and functional diseases of the GI tract and therefore we will pursue a referral to Dr. Mckeon.  I will arrange for further evaluation if necessary based on the lab testing.  In the meantime, I suggested that she continue with her low-fat diet and that she could try Imodium 1 tablet a day or one-half tablet a day as needed to see if this will control her loose stools.         Again, thank you for allowing me to participate in the care of your patient.      Sincerely,    Raul Rojo MD

## 2018-12-17 NOTE — PATIENT INSTRUCTIONS
I've included a brief summary of your discussion and care plan from today's visit below.  Please review this information with your primary care provider.  _______________________________________________________________________    As discussed by Dr. Rojo, we will proceed with the following:     - Upper endoscopy     - Labs today.     - Referral to Dr. Mckeon for management of possible IBS symptoms.      As we spoke, once we get results of the tests, we will be discussing them and making the necessary changes.   _______________________________________________________________________    It was a pleasure seeing you in clinic today - please be in touch if there are any further questions that arise following today's visit.  During business hours, you may reach my Clinic Coordinator at [phone].  For urgent/emergent questions after business hours, you may reach the on-call GI Fellow by contacting the [facility]  at [phone].    Any benign/non-urgent test results are usually communicated via letter or mxHerohart message within 1-2 weeks after completion.  Urgent results (those that require a change in the previously-discussed care plan) are usually communicated via a phone call once available from our clinic staff to discuss the results and the next steps in your evaluation.    I recommend signing up for mxHerohart access if you have not already done so and are comfortable with using a computer.  This allows for online access to your lab results and also helps you communicate efficiently with my clinic should any questions arise in your care.      Sincerely,    Lori Apple RN   Care Coordinator, Dr. Rojo

## 2018-12-18 ENCOUNTER — TELEPHONE (OUTPATIENT)
Dept: GASTROENTEROLOGY | Facility: CLINIC | Age: 18
End: 2018-12-18

## 2018-12-18 LAB
TTG IGA SER-ACNC: <1 U/ML
TTG IGG SER-ACNC: <1 U/ML

## 2019-01-16 ENCOUNTER — CARE COORDINATION (OUTPATIENT)
Dept: GASTROENTEROLOGY | Facility: CLINIC | Age: 19
End: 2019-01-16

## 2019-01-18 ENCOUNTER — TELEPHONE (OUTPATIENT)
Dept: GASTROENTEROLOGY | Facility: CLINIC | Age: 19
End: 2019-01-18

## 2019-01-18 NOTE — TELEPHONE ENCOUNTER
: [x] N/A   [] Yes:  Language /  ID:       Patient scheduled for:  [x] EGD  [] Colonoscopy  [] EUS  [] Flex Sig   [] Other:     Indication for procedure. [] Screening   [x] Loose stools     Procedure Provider:  Darrel      Referring Provider. Curtis Thorpe    Arrival time verified: Wed; 1/23/19; 0700    Facility location verified:   [x][facility] - [address] [place] [address], 1st Floor, [address]  [address] [facility], 5th floor       Prep Type:   []Golytely eRx: ;  [] MoviPrep: , [] MiraLax: , [] Other:   [x]NPO /p MN, No solid food /p 2200 the night before    Anticoagulants or blood thinners: [x]None [] ASA 81mg [] Warfarin  [] Warfarin + Lovenox bridge [] Plavix [] Effient [] Eliquis  [] Xarelto  [] Brilinta [] NSAIDS  [] Other    LAST anticoagulant dose: Date/Time:   INR:     Electronic implanted devices: [x] No  [] IPG  []  ICD  []  LVAD  []     H&P / Pre op physical completed: [x] N/A, [] Complete, Date , [] Scheduled, Date , [] No,     ______________________________________________________        Instructions given: [x] Rec d & Read   [] Reviewed   [] Resent via Room     [] Resent via eMail (see below)     Pre procedure teaching completed: [x] Yes - Reviewed, [] No,     Transportation from procedure: [x] Yes Dad, [] Pending , [] No     Additional Information: None    Christal Perry, RN  [facility]

## 2019-01-23 ENCOUNTER — HOSPITAL ENCOUNTER (OUTPATIENT)
Facility: CLINIC | Age: 19
Discharge: HOME | End: 2019-01-23
Attending: INTERNAL MEDICINE | Admitting: INTERNAL MEDICINE
Payer: COMMERCIAL

## 2019-01-23 VITALS
HEART RATE: 96 BPM | OXYGEN SATURATION: 96 % | HEIGHT: 66 IN | WEIGHT: 117 LBS | BODY MASS INDEX: 18.8 KG/M2 | SYSTOLIC BLOOD PRESSURE: 97 MMHG | DIASTOLIC BLOOD PRESSURE: 57 MMHG | RESPIRATION RATE: 17 BRPM

## 2019-01-23 PROCEDURE — 25000125 ZZHC RX 250: Performed by: INTERNAL MEDICINE

## 2019-01-23 PROCEDURE — 45380 COLONOSCOPY AND BIOPSY: CPT | Performed by: INTERNAL MEDICINE

## 2019-01-23 PROCEDURE — 43239 EGD BIOPSY SINGLE/MULTIPLE: CPT | Performed by: INTERNAL MEDICINE

## 2019-01-23 PROCEDURE — G0500 MOD SEDAT ENDO SERVICE >5YRS: HCPCS | Performed by: INTERNAL MEDICINE

## 2019-01-23 PROCEDURE — 25000128 H RX IP 250 OP 636: Performed by: INTERNAL MEDICINE

## 2019-01-23 PROCEDURE — 88305 TISSUE EXAM BY PATHOLOGIST: CPT | Performed by: INTERNAL MEDICINE

## 2019-01-23 RX ORDER — ONDANSETRON 2 MG/ML
4 INJECTION INTRAMUSCULAR; INTRAVENOUS
Status: DISCONTINUED | OUTPATIENT
Start: 2019-01-23 | End: 2019-01-23 | Stop reason: HOSPADM

## 2019-01-23 RX ORDER — ONDANSETRON 2 MG/ML
4 INJECTION INTRAMUSCULAR; INTRAVENOUS EVERY 6 HOURS PRN
Status: CANCELLED | OUTPATIENT
Start: 2019-01-23

## 2019-01-23 RX ORDER — FLUMAZENIL 0.1 MG/ML
0.2 INJECTION, SOLUTION INTRAVENOUS
Status: CANCELLED | OUTPATIENT
Start: 2019-01-23 | End: 2019-01-23

## 2019-01-23 RX ORDER — LIDOCAINE 40 MG/G
CREAM TOPICAL
Status: DISCONTINUED | OUTPATIENT
Start: 2019-01-23 | End: 2019-01-23 | Stop reason: HOSPADM

## 2019-01-23 RX ORDER — NALOXONE HYDROCHLORIDE 0.4 MG/ML
.1-.4 INJECTION, SOLUTION INTRAMUSCULAR; INTRAVENOUS; SUBCUTANEOUS
Status: CANCELLED | OUTPATIENT
Start: 2019-01-23 | End: 2019-01-24

## 2019-01-23 RX ORDER — ONDANSETRON 4 MG/1
4 TABLET, ORALLY DISINTEGRATING ORAL EVERY 6 HOURS PRN
Status: CANCELLED | OUTPATIENT
Start: 2019-01-23

## 2019-01-23 RX ORDER — FENTANYL CITRATE 50 UG/ML
INJECTION, SOLUTION INTRAMUSCULAR; INTRAVENOUS PRN
Status: DISCONTINUED | OUTPATIENT
Start: 2019-01-23 | End: 2019-01-23 | Stop reason: HOSPADM

## 2019-01-23 ASSESSMENT — MIFFLIN-ST. JEOR: SCORE: 1317.46

## 2019-01-23 NOTE — DISCHARGE INSTRUCTIONS
Discharge Instructions after  Upper Endoscopy (EGD)    Activity and Diet  You were given medicine for pain. You may be dizzy or sleepy.  For 24 hours:    Do not drive or use heavy equipment.    Do not make important decisions.    Do not drink any alcohol.  __x_ You may return to your regular diet.    Discomfort  You may have a sore throat for 2 to 3 days. It may help to:    Avoid hot liquids for 24 hours.    Use sore throat lozenges.    Gargle as needed with salt water up to 4 times a day. Mix 1 cup of warm water  with 1 teaspoon of salt. Do not swallow.      You may take Tylenol (acetaminophen) for pain unless your doctor has told you not to.    Do not take aspirin or ibuprofen (Advil, Motrin) or other NSAIDS  (anti-inflammatory drugs) for _3__ days.    Follow-up  _x__ We took small tissue samples for study. If you do not have a follow-up visit scheduled,  call your provider s office in 2 weeks for the results.    Other instructions________________________________________________________    When to call us:  Problems are rare. Call right away if you have:    Unusual throat pain or trouble swallowing    Unusual pain in belly or chest that is not relieved by belching or passing air    Black stools (tar-like looking bowel movement)    Temperature above 100.6  F. (37.5  C).    If you vomit blood or have severe pain, go to an emergency room.    If you have questions, call:  Monday to Friday, 7 a.m. to 4:30 p.m.: Endoscopy: [phone] (We may have to call you back)    After hours: Hospital: [phone] (Ask for the GI fellow on call)

## 2019-01-23 NOTE — OR NURSING
Pt had some discomfort during EGD with biopsies, extra sedation medication given. Pt otherwise tolerated procedure well. Pt stable upon transport to recovery.

## 2019-01-23 NOTE — LETTER
"    Patient:  Dahiana Kirkpatrick  :   2000  MRN:     [id number]        Ms.Emily Kirkpatrick  [address] [facility] [address]        2019    Dear ,    We are writing to inform you of your test results. The biopsies from your small intestine were normal.    Resulted Orders   Surgical pathology exam   Result Value Ref Range    Copath Report       Patient Name: DAHIANA KIRKPATRICK  MR#: [id number]  Specimen #: [id number]  Collected: 2019  Received: 2019  Reported: 2019 12:15  Ordering Phy(s): ZEYNEP DÍAZ    For improved result formatting, select 'View Enhanced Report Format' under   Linked Documents section.    SPECIMEN(S):  Duodenal biopsy    FINAL DIAGNOSIS:  DUODENUM, BIOPSY:  - Small bowel mucosa with no significant histologic change  - No evidence of celiac disease    I have personally reviewed all specimens and/or slides, including the   listed special stains, and used them  with my medical judgement to determine or confirm the final diagnosis.    Electronically signed out by:    Kevin Garcia M.D., [facility]    CLINICAL HISTORY:  The patient is a 18 year old woman presenting with diarrhea, loose stools,   bloating and dyspepsia improved  with 1/2 tab Imodium daily. Pt with history of pancreatic cyst which is   being followed conservatively. Recent  normal fecal elastase and celiac serology.  GROSS:   The specimen is received in formalin with proper patient identification,   labeled \"small intestine, duodenum\".  The specimen consists of six tan-brown pieces of irregular soft tissue   ranging 0.2-2.5 cm in greatest  dimension which are entirely submitted in cassette A1. (Dictated by:   Aubrey Lu 2019 02:58 PM)    MICROSCOPIC:  Microscopic examination was performed.    CPT Codes:  A: 39045-IM8    TESTING LAB LOCATION:  [facility], [address]   " [address]  [phone]    COLLECTION SITE:  Client: [facility] [place]  Location: GAL (B)    Resident  [id number]       Please feel free to call if you have any questions about this letter. I can be reached at [phone].    SOFIA Rojo MD  Associate Professor of Medicine  Division of Gastroenterology, Hepatology and Nutrition  [facility]

## 2019-01-24 LAB
COPATH REPORT: NORMAL
UPPER GI ENDOSCOPY: NORMAL

## 2019-04-03 NOTE — TELEPHONE ENCOUNTER
FUTURE VISIT INFORMATION      FUTURE VISIT INFORMATION:    Date: 4/17/19    Time: 11:40AM    Location: [facility]  REFERRAL INFORMATION:    Referring provider:  Dr. Raul Rojo    Referring providers clinic:  [facility]    Reason for visit/diagnosis:  IBS symptoms and Diarrhea    NOTES STATUS DETAILS   OFFICE NOTE from referring provider  Internal 12/17/18, 3/5/18   OFFICE NOTE from other specialist   N/A    DISCHARGE SUMMARY FROM HOSPITAL N/A    DISCHARGE REPORT FROM ED N/A    OPERATIVE REPORT  N/A    PFC REPORT N/A    MEDICATION LIST N/A    LABS     FIT/STOOL TESTING N/A    PERTINENT LABS Internal    PATHOLOGY REPORTS RELATED TO DIAGNOSIS Internal 1/23/19   DIAGNOSTIC PROCEDURES     COLONOSCOPY N/A    ENDOSCOPY (EGD) Internal 1/23/19   ERCP N/A    EUS Internal 6/9/16   FLEX SIGMOIDOSCOPY N/A    IMAGING & REPORT      CT, MRI, US, XR Internal

## 2019-04-15 ENCOUNTER — TELEPHONE (OUTPATIENT)
Dept: GASTROENTEROLOGY | Facility: CLINIC | Age: 19
End: 2019-04-15

## 2019-04-15 NOTE — TELEPHONE ENCOUNTER
Called and left message for patient reminding of appointment scheduled [facility]/17/19 at 1140am [facility]. Patient to arrive 15 min early. To reschedule or cancel patient to call [phone].    DANIELE Al

## 2019-04-17 ENCOUNTER — PRE VISIT (OUTPATIENT)
Dept: GASTROENTEROLOGY | Facility: CLINIC | Age: 19
End: 2019-04-17

## 2019-04-17 ENCOUNTER — OFFICE VISIT (OUTPATIENT)
Dept: GASTROENTEROLOGY | Facility: CLINIC | Age: 19
End: 2019-04-17
Payer: COMMERCIAL

## 2019-04-17 VITALS
OXYGEN SATURATION: 100 % | WEIGHT: 118.7 LBS | BODY MASS INDEX: 19.08 KG/M2 | HEIGHT: 66 IN | DIASTOLIC BLOOD PRESSURE: 65 MMHG | HEART RATE: 81 BPM | SYSTOLIC BLOOD PRESSURE: 109 MMHG | TEMPERATURE: 99.3 F

## 2019-04-17 DIAGNOSIS — R19.5 LOOSE STOOLS: ICD-10-CM

## 2019-04-17 ASSESSMENT — ENCOUNTER SYMPTOMS
RECTAL PAIN: 0
VOMITING: 0
CONSTIPATION: 0
JAUNDICE: 0
DIARRHEA: 1
NAUSEA: 0
BOWEL INCONTINENCE: 0
BLOOD IN STOOL: 0
BLOATING: 1
ABDOMINAL PAIN: 1
HEARTBURN: 1

## 2019-04-17 ASSESSMENT — PAIN SCALES - GENERAL: PAINLEVEL: NO PAIN (0)

## 2019-04-17 ASSESSMENT — MIFFLIN-ST. JEOR: SCORE: 1325.17

## 2019-04-17 NOTE — NURSING NOTE
"  Chief Complaint   Patient presents with     Consult     New consult IBS symptoms and diarrhea, referred by Dr. Rojo     Vitals:    04/17/19 1124   BP: 109/65   Pulse: 81   Temp: 99.3  F (37.4  C)   TempSrc: Oral   SpO2: 100%   Weight: 53.8 kg (118 lb 11.2 oz)   Height: 1.676 m (5' 6\")     Body mass index is 19.16 kg/m .  Tonia Cuevas CMA    "

## 2019-04-17 NOTE — LETTER
To Whom It MayConcern:       Dahiana Deloris  was seen in our clinic on 4/17/2019 for a GI appointment.          Provider Signature:         Dr. Cassi Mckeon

## 2019-04-17 NOTE — LETTER
4/17/2019       RE: Dahiana Puentes  [address] [address]     Dear Colleague,    Thank you for referring your patient, Dahiana Puentes, to the [facility] GASTRO[facility]OLOGY AND IBD CLINIC at [facility]. Please see a copy of my visit note below.    GI CLINIC VISIT    CC/REFERRING MD:  Raul Rojo  REASON FOR CONSULTATION: Loose stools   ASSESSMENT/PLAN:    Dahiana Puentes is a 18-year old female with allergic rhinitis, pancreatic cyst (non-diagnostic needle aspiration, with no concerning EUS features, possible serous cystadenoma, followed by Dr ALEIDA Rojo with plan for MRI in 3 years), anxiety, and heart palpitations. She is seen in the luminal GI clinic today for an initial consultation regarding her loose stools.     1. Loose stools   Normal [place] 4 BM each morning with no associated GI symptoms. Several times a week with afternoon/evening [place] 5-6 stools which may be accompanied by GI symptoms (urgency, cramping, bloating, increased flatus). As well as one to two episodes a month of clustered [place] 7 stools with GI symptoms. This pattern has been ongoing for several years.    Negative work-up thus far (normal CBC, hgb, MCV, TSH, celiac serologies, giardia Ag, pancreatic elastase, normal EGD with nl duodenal biopsies).     Given age of symptom onset and with near daily symptoms, IBD is a possibility. Currently not anemic with no known family hx of IBD. Would start with colonoscopy to evaluate for IBD as well as microscopic colitis. Certainly clinical picture may suggest functional bowel disease such as IBS-D.Periods of episodic-worsening and symptom-free days with improvement of symptoms after defecation.    While work-up is ongoing will trial of fiber supplementation in an effort to improve stool consistency and urgency. Ok to continue  loperamide 1/2 tab prophylactically (before events, travel, tests, etc)   .  RTC 3 months    Thank you for this  consultation.  It was a pleasure to participate in the care of this patient; please contact us with any further questions.     Reena Gillespie APRN, CNP, CWOCN  Division of Gastroenterology, Hepatology and Nutrition  [facility]    ---------------------------------------  Scribe Disclosure:   I,Reena Gillespie, am serving as a scribe; to document services personally performed by Soumya Mckeon MD- -based on data collection and the provider's statements to me.      Provider Disclosure:  I agree with above History, Review of Systems, Physical exam and Plan.  I have reviewed the content of the documentation and have edited it as needed. I have personally performed the services documented here and the documentation accurately represents those services and the decisions I have made.      It was a pleasure to participate in the care of this patient; please contact us with any further questions.  A total of 20 face-to-face minutes was spent with this patient, >50% of which was counseling regarding the above delineated issues     Electronically signed by:  Soumya Mckeon MD     --------------------------------------    HPI  Dahiana Puentes is a 18-year old female with allergic rhinitis, pancreatic cyst (non-diagnostic needle aspiration, with no concerning EUS features, possible serous cystadenoma, followed by Dr ALEIDA Rooj with plan for MRI in 3 years), anxiety, and heart palpitations. She is seen in the luminal GI clinic today for an initial consultation regarding her loose stools.     Patient is seen in clinic with her mother Haley today who provided additional history.     Ms. Puentes reports her loose stools has been ongoing for about 5-years with progressive worsening in the last two years. She is quite concerned about her unpredictable stool pattern and feels it restricts her ability to leave the house due to fear of GI symptoms.  Her current bowel pattern is one bowel movement in the morning (Cache 3-4)  with occasional 1-2 stools in the afternoon/ evening (Dor[place] 5-6).  Once or twice a month she has an episode of 2-4 [place] 7 stools with urgency.  She hasn't noted any particular trigger for the urgent [place] 7 episodes. Other symptoms associated with her loose stools includes frequent bloating, urgency, excessive flatus, abdominal cramping, and borborygmi. These symptoms  usually resolve after bowel movements. Her bloating also may improve after passing flatus. Sometimes she may have all of these symptoms, or just one at a time. She does feel that stress, anxiety and certain foods (reportedly fatty foods, dairy products, and soft serve ice cream) worsen her symptoms.     She denies blood in the stools, insecurity of passing stools, incomplete emptying, fecal incontinence, and tenesmus. She also denies nausea and vomiting.     For treatment, she tried last summer to eliminate gluten. She did this for 30 days with no notable change in the stool pattern. She currently uses imodium 1/2 tablet to help minimize the loose stools and this has been helpful.    Work-up to date includes negative giardia Ag (March 2018), normal fecal elastase (3/6/2018), and normal celiac serology (TTG IgG and IgA with a normal total IgA). An upper endoscopy on 1/23/2018 was essentially normal. Duodenal biopsies were negative for celiac sprue. Notably prior  TSH (Dec 2018) and CBC (Dec 2018) were normal    ROS:    No fevers or chills  No weight loss  No blurry vision, double vision or change in vision  No sore throat  No lymphadenopathy  No headache, paraesthesias, or weakness in a limb  No shortness of breath or wheezing  No chest pain or pressure  No arthralgias or myalgias  No rashes or skin changes  No odynophagia or dysphagia  No BRBPR, hematochezia, melena  No dysuria, frequency or urgency  No hot/cold intolerance or polyria  + anxiety or depression      PERTINENT PAST MEDICAL HISTORY:  Past Medical History:   Diagnosis Date      Allergic rhinitis      Anxiety      Chronic diarrhea      Cyst of pancreas      Dysphagia      Esophageal reflux      Heart palpitations      Other bladder disorder      Scoliosis      Stomach problems      PREVIOUS SURGERIES:  Past Surgical History:   Procedure Laterality Date     BIOPSY       COLONOSCOPY Left 1/23/2019    Procedure: EGD;  Surgeon: Raul Rojo MD;  Location: [facility] GI     COLONOSCOPY N/A 5/14/2019    Procedure: COLONOSCOPY, WITH POLYPECTOMY AND BIOPSY;  Surgeon: Soumya Mckeon MD;  Location: [facility] OR     ENDOSCOPIC ULTRASOUND UPPER GASTROINTESTINAL TRACT (GI) N/A 6/9/2016    Procedure: ENDOSCOPIC ULTRASOUND, ESOPHAGOSCOPY / UPPER GASTROINTESTINAL TRACT (GI);  Surgeon: Raul Rojo MD;  Location: [facility] OR     PREVIOUS ENDOSCOPY:  Upper Endoscopy 1/23/2019    ALLERGIES:     Allergies   Allergen Reactions     Penicillins Hives and Rash     Sulfa Drugs Hives and Rash     PERTINENT MEDICATIONS:    Current Outpatient Medications:      cetirizine (ZYRTEC ALLERGY) 10 MG tablet, Take 10 mg by mouth as needed, Disp: , Rfl:      Cholecalciferol (VITAMIN D) 2000 UNITS CAPS, Take 2,000 Units by mouth daily, Disp: , Rfl:      clindamycin (CLINDAMAX) 1 % gel, Apply topically 2 times daily, Disp: , Rfl:      Drospirenone-Ethinyl Estradiol (TAMIA PO), 1 tablet Per instructions provided, Disp: , Rfl:      psyllium (METAMUCIL) 28.3 % POWD, , Disp: , Rfl:      spironolactone (ALDACTONE) 25 MG tablet, Take 25 mg by mouth 2 times daily, Disp: , Rfl:      bisacodyl (DULCOLAX) 5 MG EC tablet, Take 2 tablets (10 mg) by mouth daily as needed for constipation, Disp: 4 tablet, Rfl: 0     pseudoePHEDrine (SUDAFED) 30 MG tablet, Take by mouth every 4 hours as needed for congestion, Disp: , Rfl:     SOCIAL HISTORY:  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   Tobacco use None   Alcohol use: None   illicit drug use: None  Social History     Socioeconomic History     Marital status: Single      "Spouse name: Not on file     Number of children: Not on file     Years of education: Not on file     Highest education level: Not on file   Occupational History     Not on file   Social Needs     Financial resource strain: Not on file     Food insecurity:     Worry: Not on file     Inability: Not on file     Transportation needs:     Medical: Not on file     Non-medical: Not on file   Tobacco Use     Smoking status: Never Smoker     Smokeless tobacco: Never Used   Substance and Sexual Activity     Alcohol use: Never     Alcohol/week: 0.0 oz     Drug use: Never     Sexual activity: Never     Comment: not sexually active   Lifestyle     Physical activity:     Days per week: Not on file     Minutes per session: Not on file     Stress: Not on file   Relationships     Social connections:     Talks on phone: Not on file     Gets together: Not on file     Attends [employer] service: Not on file     Active member of club or organization: Not on file     Attends meetings of clubs or organizations: Not on file     Relationship status: Not on file     Intimate partner violence:     Fear of current or ex partner: Not on file     Emotionally abused: Not on file     Physically abused: Not on file     Forced sexual activity: Not on file   Other Topics Concern     Parent/sibling w/ CABG, MI or angioplasty before 65F 55M? Not Asked   Social History Narrative     Not on file       FAMILY HISTORY:  FH of CRC:None   FH of IBD:None   Grandmother and aunt with IBS-D      PHYSICAL EXAMINATION:  Constitutional: aaox3, cooperative, pleasant, not dyspneic/diaphoretic, no acute distress  Vitals reviewed: /65   Pulse 81   Temp 99.3  F (37.4  C) (Oral)   Ht 1.676 m (5' 6\")   Wt 53.8 kg (118 lb 11.2 oz)   LMP 04/14/2019   SpO2 100%   BMI 19.16 kg/m     Wt:   Wt Readings from Last 2 Encounters:   05/14/19 53.5 kg (118 lb)   04/17/19 53.8 kg (118 lb 11.2 oz)   Eyes: Sclera anicteric/injected  Ears/nose/mouth/throat: Normal oropharynx " without ulcers or exudate, mucus membranes moist, hearing intact  Neck: supple, no submandibular, supraclavicular LAD  CV: RRR, nl S1, S2  Respiratory: clear on anterior exam  Abd:  Nondistended, +bs, nontender  Skin: warm, perfused, no jaundice  Psych: Normal affect  MSK: Normal gait    PERTINENT STUDIES:    Orders Only on 12/17/2018   Component Date Value Ref Range Status     Albumin 12/17/2018 3.8  3.4 - 5.0 g/dL Final     IGA 12/17/2018 117  70 - 380 mg/dL Final     Tissue Transglutaminase Antibody I* 12/17/2018 <1  <7 U/mL Final     Tissue Transglutaminase Tari IgG 12/17/2018 <1  <7 U/mL Final     TSH 12/17/2018 2.38  0.40 - 4.00 mU/L Final     Sed Rate 12/17/2018 5  0 - 20 mm/h Final     WBC 12/17/2018 7.7  4.0 - 11.0 10e9/L Final     RBC Count 12/17/2018 4.64  3.8 - 5.2 10e12/L Final     Hemoglobin 12/17/2018 14.1  11.7 - 15.7 g/dL Final     Hematocrit 12/17/2018 41.8  35.0 - 47.0 % Final     MCV 12/17/2018 90  78 - 100 fl Final     MCH 12/17/2018 30.4  26.5 - 33.0 pg Final     MCHC 12/17/2018 33.7  31.5 - 36.5 g/dL Final     RDW 12/17/2018 11.3  10.0 - 15.0 % Final     Platelet Count 12/17/2018 337  150 - 450 10e9/L Final     Diff Method 12/17/2018 Automated Method   Final     % Neutrophils 12/17/2018 60.0  % Final     % Lymphocytes 12/17/2018 32.7  % Final     % Monocytes 12/17/2018 5.5  % Final     % Eosinophils 12/17/2018 0.7  % Final     % Basophils 12/17/2018 0.7  % Final     % Immature Granulocytes 12/17/2018 0.4  % Final     Nucleated RBCs 12/17/2018 0  0 /100 Final     Absolute Neutrophil 12/17/2018 4.6  1.6 - 8.3 10e9/L Final     Absolute Lymphocytes 12/17/2018 2.5  0.8 - 5.3 10e9/L Final     Absolute Monocytes 12/17/2018 0.4  0.0 - 1.3 10e9/L Final     Absolute Eosinophils 12/17/2018 0.1  0.0 - 0.7 10e9/L Final     Absolute Basophils 12/17/2018 0.1  0.0 - 0.2 10e9/L Final     Abs Immature Granulocytes 12/17/2018 0.0  0 - 0.4 10e9/L Final     Absolute Nucleated RBC 12/17/2018 0.0   Final        Soumya Mckeon MD

## 2019-04-17 NOTE — PATIENT INSTRUCTIONS
It was a pleasure taking care of you today.  I've included a brief summary of our discussion and care plan from today's visit below.  Please review this information with your primary care provider.  ______________________________________________________________________    My recommendations are summarized as follows:    --Will schedule for colonsocopy  --Fiber supplementation metrocellulose 3-4 capsules daily  or Metamucil start with 1 tablespoon per day and if tolerated, may increase up to 2-3 tablespoons per day.  You may experience some bloating with initiation of fiber supplementation that will improve over the first month.  A good fiber trial to evaluate the effect is 3-6 months.  --Imodium 1/2 tab prophalytically as needed to prevent loose stools     Return to GI Clinic in 3 months to review your progress with Annette Portillo PA-C   ______________________________________________________________________    Who do I call with any questions after my visit?  Please be in touch if there are any further questions that arise following today's visit.  There are multiple ways to contact your gastroenterology care team.        During business hours, you may reach a Gastroenterology nurse at [phone]      To schedule or reschedule an appointment, please call [phone].       You can always send a secure message through [employer].  [employer] messages are answered by your nurse or doctor typically within 24 hours.  Please allow extra time on weekends and holidays.        For urgent/emergent questions after business hours, you may reach the on-call GI Fellow by contacting the [facility] at [phone].     How will I get the results of any tests ordered?    You will receive all of your results.  If you have signed up for [employer], any tests ordered at your visit will be available to you after your physician reviews them.  Typically this takes 1-2 weeks.  If there are urgent results that require a  change in your care plan, your physician or nurse will call you to discuss the next steps.      What is [employer]?  [employer] is a secure way for you to access all of your healthcare records from the [facility].  It is a web based computer program, so you can sign on to it from any location.  It also allows you to send secure messages to your care team.  I recommend signing up for [employer]orap access if you have not already done so and are comfortable with using a computer.      How to I schedule a follow-up visit?  If you did not schedule a follow-up visit today, please call [phone] to schedule a follow-up office visit.        Sincerely,  Reena CLARK, CNP  Division of Gastroenterology, Hepatology & Nutrition  [facility]

## 2019-04-17 NOTE — PROGRESS NOTES
GI CLINIC VISIT    CC/REFERRING MD:  Raul Rojo  REASON FOR CONSULTATION: Loose stools   ASSESSMENT/PLAN:    Dahiana Puentes is a 18-year old female with allergic rhinitis, pancreatic cyst (non-diagnostic needle aspiration, with no concerning EUS features, possible serous cystadenoma, followed by Dr ALEIDA Rojo with plan for MRI in 3 years), anxiety, and heart palpitations. She is seen in the luminal GI clinic today for an initial consultation regarding her loose stools.     1. Loose stools   Normal Appling 4 BM each morning with no associated GI symptoms. Several times a week with afternoon/evening Appling 5-6 stools which may be accompanied by GI symptoms (urgency, cramping, bloating, increased flatus). As well as one to two episodes a month of clustered Appling 7 stools with GI symptoms. This pattern has been ongoing for several years.    Negative work-up thus far (normal CBC, hgb, MCV, TSH, celiac serologies, giardia Ag, pancreatic elastase, normal EGD with nl duodenal biopsies).     Given age of symptom onset and with near daily symptoms, IBD is a possibility. Currently not anemic with no known family hx of IBD. Would start with colonoscopy to evaluate for IBD as well as microscopic colitis. Certainly clinical picture may suggest functional bowel disease such as IBS-D.Periods of episodic-worsening and symptom-free days with improvement of symptoms after defecation.    While work-up is ongoing will trial of fiber supplementation in an effort to improve stool consistency and urgency. Ok to continue  loperamide 1/2 tab prophylactically (before events, travel, tests, etc)   .  RTC 3 months    Thank you for this consultation.  It was a pleasure to participate in the care of this patient; please contact us with any further questions.       Reena Gillespie APRN, CNP, CWOCN  Division of Gastroenterology, Hepatology and Nutrition  [facility]    ---------------------------------------  Scribe Disclosure:    I,Reena Gillespie, am serving as a scribe; to document services personally performed by Soumya Mckeon MD- -based on data collection and the provider's statements to me.      Provider Disclosure:  I agree with above History, Review of Systems, Physical exam and Plan.  I have reviewed the content of the documentation and have edited it as needed. I have personally performed the services documented here and the documentation accurately represents those services and the decisions I have made.      It was a pleasure to participate in the care of this patient; please contact us with any further questions.  A total of 20 face-to-face minutes was spent with this patient, >50% of which was counseling regarding the above delineated issues     Electronically signed by:  Soumya Mckeon MD     --------------------------------------      HPI  Dahiana Puentes is a 18-year old female with allergic rhinitis, pancreatic cyst (non-diagnostic needle aspiration, with no concerning EUS features, possible serous cystadenoma, followed by Dr ALEIDA Rojo with plan for MRI in 3 years), anxiety, and heart palpitations. She is seen in the luminal GI clinic today for an initial consultation regarding her loose stools.     Patient is seen in clinic with her mother Haley today who provided additional history.     Ms. Puentes reports her loose stools has been ongoing for about 5-years with progressive worsening in the last two years. She is quite concerned about her unpredictable stool pattern and feels it restricts her ability to leave the house due to fear of GI symptoms.  Her current bowel pattern is one bowel movement in the morning (Houston 3-4) with occasional 1-2 stools in the afternoon/ evening (Houston 5-6).  Once or twice a month she has an episode of 2-4 Houston 7 stools with urgency.  She hasn't noted any particular trigger for the urgent Houston 7 episodes. Other symptoms associated with her loose stools includes frequent  bloating, urgency, excessive flatus, abdominal cramping, and borborygmi. These symptoms  usually resolve after bowel movements. Her bloating also may improve after passing flatus. Sometimes she may have all of these symptoms, or just one at a time. She does feel that stress, anxiety and certain foods (reportedly fatty foods, dairy products, and soft serve ice cream) worsen her symptoms.     She denies blood in the stools, insecurity of passing stools, incomplete emptying, fecal incontinence, and tenesmus. She also denies nausea and vomiting.     For treatment, she tried last summer to eliminate gluten. She did this for 30 days with no notable change in the stool pattern. She currently uses imodium 1/2 tablet to help minimize the loose stools and this has been helpful.    Work-up to date includes negative giardia Ag (March 2018), normal fecal elastase (3/6/2018), and normal celiac serology (TTG IgG and IgA with a normal total IgA). An upper endoscopy on 1/23/2018 was essentially normal. Duodenal biopsies were negative for celiac sprue. Notably prior  TSH (Dec 2018) and CBC (Dec 2018) were normal    ROS:    No fevers or chills  No weight loss  No blurry vision, double vision or change in vision  No sore throat  No lymphadenopathy  No headache, paraesthesias, or weakness in a limb  No shortness of breath or wheezing  No chest pain or pressure  No arthralgias or myalgias  No rashes or skin changes  No odynophagia or dysphagia  No BRBPR, hematochezia, melena  No dysuria, frequency or urgency  No hot/cold intolerance or polyria  + anxiety or depression      PERTINENT PAST MEDICAL HISTORY:  Past Medical History:   Diagnosis Date     Allergic rhinitis      Anxiety      Chronic diarrhea      Cyst of pancreas      Dysphagia      Esophageal reflux      Heart palpitations      Other bladder disorder      Scoliosis      Stomach problems      PREVIOUS SURGERIES:  Past Surgical History:   Procedure Laterality Date     BIOPSY        COLONOSCOPY Left 1/23/2019    Procedure: EGD;  Surgeon: Raul Rojo MD;  Location: [facility]     COLONOSCOPY N/A 5/14/2019    Procedure: COLONOSCOPY, WITH POLYPECTOMY AND BIOPSY;  Surgeon: Soumya Mckeon MD;  Location:  OR     ENDOSCOPIC ULTRASOUND UPPER GASTROINTESTINAL TRACT (GI) N/A 6/9/2016    Procedure: ENDOSCOPIC ULTRASOUND, ESOPHAGOSCOPY / UPPER GASTROINTESTINAL TRACT (GI);  Surgeon: Raul Rojo MD;  Location: [facility]     PREVIOUS ENDOSCOPY:  Upper Endoscopy 1/23/2019    ALLERGIES:     Allergies   Allergen Reactions     Penicillins Hives and Rash     Sulfa Drugs Hives and Rash     PERTINENT MEDICATIONS:    Current Outpatient Medications:      cetirizine (ZYRTEC ALLERGY) 10 MG tablet, Take 10 mg by mouth as needed, Disp: , Rfl:      Cholecalciferol (VITAMIN D) 2000 UNITS CAPS, Take 2,000 Units by mouth daily, Disp: , Rfl:      clindamycin (CLINDAMAX) 1 % gel, Apply topically 2 times daily, Disp: , Rfl:      Drospirenone-Ethinyl Estradiol (TAMIA PO), 1 tablet Per instructions provided, Disp: , Rfl:      psyllium (METAMUCIL) 28.3 % POWD, , Disp: , Rfl:      spironolactone (ALDACTONE) 25 MG tablet, Take 25 mg by mouth 2 times daily, Disp: , Rfl:      bisacodyl (DULCOLAX) 5 MG EC tablet, Take 2 tablets (10 mg) by mouth daily as needed for constipation, Disp: 4 tablet, Rfl: 0     pseudoePHEDrine (SUDAFED) 30 MG tablet, Take by mouth every 4 hours as needed for congestion, Disp: , Rfl:     SOCIAL HISTORY:  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   Tobacco use None   Alcohol use: None   illicit drug use: None  Social History     Socioeconomic History     Marital status: Single     Spouse name: Not on file     Number of children: Not on file     Years of education: Not on file     Highest education level: Not on file   Occupational History     Not on file   Social Needs     Financial resource strain: Not on file     Food insecurity:     Worry: Not on file     Inability: Not on  "file     Transportation needs:     Medical: Not on file     Non-medical: Not on file   Tobacco Use     Smoking status: Never Smoker     Smokeless tobacco: Never Used   Substance and Sexual Activity     Alcohol use: Never     Alcohol/week: 0.0 oz     Drug use: Never     Sexual activity: Never     Comment: not sexually active   Lifestyle     Physical activity:     Days per week: Not on file     Minutes per session: Not on file     Stress: Not on file   Relationships     Social connections:     Talks on phone: Not on file     Gets together: Not on file     Attends Cheondoism service: Not on file     Active member of club or organization: Not on file     Attends meetings of clubs or organizations: Not on file     Relationship status: Not on file     Intimate partner violence:     Fear of current or ex partner: Not on file     Emotionally abused: Not on file     Physically abused: Not on file     Forced sexual activity: Not on file   Other Topics Concern     Parent/sibling w/ CABG, MI or angioplasty before 65F 55M? Not Asked   Social History Narrative     Not on file       FAMILY HISTORY:  FH of CRC:None   FH of IBD:None   Grandmother and aunt with IBS-D      PHYSICAL EXAMINATION:  Constitutional: aaox3, cooperative, pleasant, not dyspneic/diaphoretic, no acute distress  Vitals reviewed: /65   Pulse 81   Temp 99.3  F (37.4  C) (Oral)   Ht 1.676 m (5' 6\")   Wt 53.8 kg (118 lb 11.2 oz)   LMP 04/14/2019   SpO2 100%   BMI 19.16 kg/m    Wt:   Wt Readings from Last 2 Encounters:   05/14/19 53.5 kg (118 lb)   04/17/19 53.8 kg (118 lb 11.2 oz)   Eyes: Sclera anicteric/injected  Ears/nose/mouth/throat: Normal oropharynx without ulcers or exudate, mucus membranes moist, hearing intact  Neck: supple, no submandibular, supraclavicular LAD  CV: RRR, nl S1, S2  Respiratory: clear on anterior exam  Abd:  Nondistended, +bs, nontender  Skin: warm, perfused, no jaundice  Psych: Normal affect  MSK: Normal gait    PERTINENT " STUDIES:    Orders Only on 12/17/2018   Component Date Value Ref Range Status     Albumin 12/17/2018 3.8  3.4 - 5.0 g/dL Final     IGA 12/17/2018 117  70 - 380 mg/dL Final     Tissue Transglutaminase Antibody I* 12/17/2018 <1  <7 U/mL Final     Tissue Transglutaminase Tari IgG 12/17/2018 <1  <7 U/mL Final     TSH 12/17/2018 2.38  0.40 - 4.00 mU/L Final     Sed Rate 12/17/2018 5  0 - 20 mm/h Final     WBC 12/17/2018 7.7  4.0 - 11.0 10e9/L Final     RBC Count 12/17/2018 4.64  3.8 - 5.2 10e12/L Final     Hemoglobin 12/17/2018 14.1  11.7 - 15.7 g/dL Final     Hematocrit 12/17/2018 41.8  35.0 - 47.0 % Final     MCV 12/17/2018 90  78 - 100 fl Final     MCH 12/17/2018 30.4  26.5 - 33.0 pg Final     MCHC 12/17/2018 33.7  31.5 - 36.5 g/dL Final     RDW 12/17/2018 11.3  10.0 - 15.0 % Final     Platelet Count 12/17/2018 337  150 - 450 10e9/L Final     Diff Method 12/17/2018 Automated Method   Final     % Neutrophils 12/17/2018 60.0  % Final     % Lymphocytes 12/17/2018 32.7  % Final     % Monocytes 12/17/2018 5.5  % Final     % Eosinophils 12/17/2018 0.7  % Final     % Basophils 12/17/2018 0.7  % Final     % Immature Granulocytes 12/17/2018 0.4  % Final     Nucleated RBCs 12/17/2018 0  0 /100 Final     Absolute Neutrophil 12/17/2018 4.6  1.6 - 8.3 10e9/L Final     Absolute Lymphocytes 12/17/2018 2.5  0.8 - 5.3 10e9/L Final     Absolute Monocytes 12/17/2018 0.4  0.0 - 1.3 10e9/L Final     Absolute Eosinophils 12/17/2018 0.1  0.0 - 0.7 10e9/L Final     Absolute Basophils 12/17/2018 0.1  0.0 - 0.2 10e9/L Final     Abs Immature Granulocytes 12/17/2018 0.0  0 - 0.4 10e9/L Final     Absolute Nucleated RBC 12/17/2018 0.0   Final     Answers for HPI/ROS submitted by the patient on 4/17/2019   General Symptoms: No  Skin Symptoms: No  HENT Symptoms: No  EYE SYMPTOMS: No  HEART SYMPTOMS: No  LUNG SYMPTOMS: No  INTESTINAL SYMPTOMS: Yes  URINARY SYMPTOMS: No  GYNECOLOGIC SYMPTOMS: No  BREAST SYMPTOMS: No  SKELETAL SYMPTOMS: No  BLOOD  SYMPTOMS: No  NERVOUS SYSTEM SYMPTOMS: No  MENTAL HEALTH SYMPTOMS: No  Heart burn or indigestion: Yes  Nausea: No  Vomiting: No  Abdominal pain: Yes  Bloating: Yes  Constipation: No  Diarrhea: Yes  Blood in stool: No  Black stools: No  Rectal or Anal pain: No  Fecal incontinence: No  Yellowing of skin or eyes: No  Vomit with blood: No  Change in stools: Yes

## 2019-04-18 ENCOUNTER — TELEPHONE (OUTPATIENT)
Dept: GASTROENTEROLOGY | Facility: CLINIC | Age: 19
End: 2019-04-18

## 2019-04-18 NOTE — TELEPHONE ENCOUNTER
Called patient and talked to her about the colonoscopy.  Order was cancelled and then placed back into chart. Patient was transferred to endoscopy to schedule.

## 2019-04-18 NOTE — TELEPHONE ENCOUNTER
[facility] Call Center    Phone Message    May a detailed message be left on voicemail: yes    Reason for Call: Other: Pt called in today and was trying to get scheduled for her colonoscopy, but there is no order in her chart. PLease reach out to pt to get this scheduled or if possible place referral in chart so pt can call in to be scheduled. Thank you.     Action Taken: Message routed to:  [facility] Gastro

## 2019-05-07 ENCOUNTER — TELEPHONE (OUTPATIENT)
Dept: GASTROENTEROLOGY | Facility: CLINIC | Age: 19
End: 2019-05-07

## 2019-05-07 DIAGNOSIS — R19.5 LOOSE STOOLS: Primary | ICD-10-CM

## 2019-05-07 RX ORDER — BISACODYL 5 MG/1
10 TABLET, DELAYED RELEASE ORAL DAILY PRN
Qty: 4 TABLET | Refills: 0 | Status: SHIPPED | OUTPATIENT
Start: 2019-05-07 | End: 2019-06-12

## 2019-05-07 NOTE — TELEPHONE ENCOUNTER
: [x] N/A   [] Yes:  Language /  ID:       Patient scheduled for:  [] EGD  [x] Colonoscopy  [] EUS  [] Flex Sig   [] Other:     Indication for procedure. [] Screening   [x] Loose stools     Sedation Type: [x] Conscious Sedation   [] MAC    Procedure Provider:  Mike      Referring Provider. Caryn Rojo Ebba, Jeneva; Bryn Dumont    Arrival time verified: Tues; 5/14/19; 0800    Facility location verified:   [][facility] - [address], 1st Floor, [phone]  [x][address], 5th floor       Prep Type:   [x]Golytely eRx: [facility] - [address];  [] MoviPrep: , [] MiraLax: , [] Other:   []NPO /p MN, No solid food /p 2200 the night before    Anticoagulants or blood thinners: [x]None [] ASA 81mg  - may continue [] Warfarin  [] Warfarin + Lovenox bridge [] Plavix [] Effient [] Eliquis [] Xarelto  [] Brilinta [] NSAIDS  [] Other    LAST anticoagulant dose: Date/Time:   INR:     Electronic implanted devices: [x] No  [] IPG  []  ICD  []  LVAD  []     H&P / Pre op physical completed: [x] N/A, [] Complete, Date , [] Scheduled, Date , [] No,     Additional Information: EGD 1/23/19 (C/S)    _______________________________________________      Instructions given: [x] Rec d & Read   [] Reviewed         [] Resent via Motion Engine     [] Resent via eMail (see below)     Pre procedure teaching completed: [x] Yes - Reviewed, [] No,     [x] No questions regarding Sedation as ordered, []     Transportation from procedure & responsible adult to be with patient following procedure for a minimum of 6 hrs (Conscious Sedation) 24 hrs (MAC): [x] Yes Father - confirmed will have post-procedure companionship as required, [] Pending , [] No     Christal Perry RN  [facility] Endoscopy

## 2019-05-10 ENCOUNTER — PATIENT OUTREACH (OUTPATIENT)
Dept: GASTROENTEROLOGY | Facility: CLINIC | Age: 19
End: 2019-05-10

## 2019-05-10 ENCOUNTER — TELEPHONE (OUTPATIENT)
Dept: GASTROENTEROLOGY | Facility: CLINIC | Age: 19
End: 2019-05-10

## 2019-05-10 DIAGNOSIS — R19.5 LOOSE STOOLS: Primary | ICD-10-CM

## 2019-05-10 DIAGNOSIS — Z12.11 SPECIAL SCREENING FOR MALIGNANT NEOPLASMS, COLON: Primary | ICD-10-CM

## 2019-05-10 RX ORDER — ONDANSETRON 4 MG/1
4 TABLET, FILM COATED ORAL ONCE
Qty: 2 TABLET | Refills: 0 | Status: SHIPPED | OUTPATIENT
Start: 2019-05-10 | End: 2019-05-10

## 2019-05-10 RX ORDER — ONDANSETRON 4 MG/1
TABLET, FILM COATED ORAL
Qty: 3 TABLET | Refills: 0 | Status: SHIPPED | OUTPATIENT
Start: 2019-05-10 | End: 2019-05-10

## 2019-05-10 NOTE — TELEPHONE ENCOUNTER
Patients mom called to request anti nausea medication to for upcoming Colonoscopy prep. Consulted with provider performing the exam and she ordered oral zofran tablets, 1 tablet taken an hour before consuming the prep. Call placed to gita De Leon of patient to confirm pharmacy to e script the prescription. [place] pharmacy was chosen.

## 2019-05-14 ENCOUNTER — HOSPITAL ENCOUNTER (OUTPATIENT)
Facility: AMBULATORY SURGERY CENTER | Age: 19
End: 2019-05-14
Attending: INTERNAL MEDICINE
Payer: COMMERCIAL

## 2019-05-14 VITALS
WEIGHT: 118 LBS | TEMPERATURE: 97.9 F | OXYGEN SATURATION: 100 % | SYSTOLIC BLOOD PRESSURE: 110 MMHG | RESPIRATION RATE: 16 BRPM | DIASTOLIC BLOOD PRESSURE: 65 MMHG | HEART RATE: 97 BPM | HEIGHT: 66 IN | BODY MASS INDEX: 18.96 KG/M2

## 2019-05-14 LAB
COLONOSCOPY: NORMAL
HCG UR QL: NEGATIVE
INTERNAL QC OK POCT: YES

## 2019-05-14 RX ORDER — FENTANYL CITRATE 50 UG/ML
INJECTION, SOLUTION INTRAMUSCULAR; INTRAVENOUS PRN
Status: DISCONTINUED | OUTPATIENT
Start: 2019-05-14 | End: 2019-05-14 | Stop reason: HOSPADM

## 2019-05-14 RX ORDER — ONDANSETRON 2 MG/ML
4 INJECTION INTRAMUSCULAR; INTRAVENOUS EVERY 6 HOURS PRN
Status: DISCONTINUED | OUTPATIENT
Start: 2019-05-14 | End: 2019-05-15 | Stop reason: HOSPADM

## 2019-05-14 RX ORDER — NALOXONE HYDROCHLORIDE 0.4 MG/ML
.1-.4 INJECTION, SOLUTION INTRAMUSCULAR; INTRAVENOUS; SUBCUTANEOUS
Status: DISCONTINUED | OUTPATIENT
Start: 2019-05-14 | End: 2019-05-15 | Stop reason: HOSPADM

## 2019-05-14 RX ORDER — LIDOCAINE 40 MG/G
CREAM TOPICAL
Status: DISCONTINUED | OUTPATIENT
Start: 2019-05-14 | End: 2019-05-14 | Stop reason: HOSPADM

## 2019-05-14 RX ORDER — SIMETHICONE
LIQUID (ML) MISCELLANEOUS PRN
Status: DISCONTINUED | OUTPATIENT
Start: 2019-05-14 | End: 2019-05-14 | Stop reason: HOSPADM

## 2019-05-14 RX ORDER — FLUMAZENIL 0.1 MG/ML
0.2 INJECTION, SOLUTION INTRAVENOUS
Status: ACTIVE | OUTPATIENT
Start: 2019-05-14 | End: 2019-05-14

## 2019-05-14 RX ORDER — ONDANSETRON 4 MG/1
4 TABLET, ORALLY DISINTEGRATING ORAL EVERY 6 HOURS PRN
Status: DISCONTINUED | OUTPATIENT
Start: 2019-05-14 | End: 2019-05-15 | Stop reason: HOSPADM

## 2019-05-14 RX ORDER — ONDANSETRON 2 MG/ML
4 INJECTION INTRAMUSCULAR; INTRAVENOUS
Status: DISCONTINUED | OUTPATIENT
Start: 2019-05-14 | End: 2019-05-14 | Stop reason: HOSPADM

## 2019-05-14 ASSESSMENT — MIFFLIN-ST. JEOR: SCORE: 1321.99

## 2019-05-15 LAB — COPATH REPORT: NORMAL

## 2019-06-04 ENCOUNTER — TELEPHONE (OUTPATIENT)
Dept: GASTROENTEROLOGY | Facility: CLINIC | Age: 19
End: 2019-06-04

## 2019-06-10 ASSESSMENT — ENCOUNTER SYMPTOMS
BLOOD IN STOOL: 0
VOMITING: 0
HEARTBURN: 0
ABDOMINAL PAIN: 0
JAUNDICE: 0
NAUSEA: 0
CONSTIPATION: 0
BLOATING: 1
BOWEL INCONTINENCE: 0
RECTAL PAIN: 0
DIARRHEA: 0

## 2019-06-10 ASSESSMENT — PATIENT HEALTH QUESTIONNAIRE - PHQ9
SUM OF ALL RESPONSES TO PHQ QUESTIONS 1-9: 0
SUM OF ALL RESPONSES TO PHQ QUESTIONS 1-9: 0

## 2019-06-12 ENCOUNTER — OFFICE VISIT (OUTPATIENT)
Dept: GASTROENTEROLOGY | Facility: CLINIC | Age: 19
End: 2019-06-12
Payer: COMMERCIAL

## 2019-06-12 VITALS
HEIGHT: 66 IN | DIASTOLIC BLOOD PRESSURE: 60 MMHG | BODY MASS INDEX: 19.4 KG/M2 | WEIGHT: 120.7 LBS | HEART RATE: 70 BPM | OXYGEN SATURATION: 100 % | TEMPERATURE: 98.8 F | SYSTOLIC BLOOD PRESSURE: 104 MMHG

## 2019-06-12 DIAGNOSIS — R19.5 LOOSE STOOLS: Primary | ICD-10-CM

## 2019-06-12 DIAGNOSIS — R09.A2 GLOBUS SENSATION: ICD-10-CM

## 2019-06-12 ASSESSMENT — PATIENT HEALTH QUESTIONNAIRE - PHQ9: SUM OF ALL RESPONSES TO PHQ QUESTIONS 1-9: 0

## 2019-06-12 ASSESSMENT — MIFFLIN-ST. JEOR: SCORE: 1334.24

## 2019-06-12 ASSESSMENT — PAIN SCALES - GENERAL: PAINLEVEL: NO PAIN (0)

## 2019-06-12 NOTE — PATIENT INSTRUCTIONS
It was a pleasure taking care of you today.  I've included a brief summary of our discussion and care plan from today's visit below.  Please review this information with your primary care provider.  ______________________________________________________________________    My recommendations are summarized as follows:    GERD Lifestyle Modifications  -- Avoid foods high in fat or high fructose corn syrup  -- do not eat within three hours of laying down or going to bed  -- raise the head of your bed 6-8 inches  -- avoid alcohol    -- Zantac 150 mg for sensation in throat     -- continue to try and identify food triggers, can consider meeting with dietitian to discuss low FODMAP diet     -- can continue imodium every day if needed      -- can use Citrucel as needed    Return to GI Clinic as needed to review your progress.    ______________________________________________________________________    Who do I call with any questions after my visit?  Please be in touch if there are any further questions that arise following today's visit.  There are multiple ways to contact your gastroenterology care team.        During business hours, you may reach a Gastroenterology nurse at [phone]      To schedule or reschedule an appointment, please call [phone].       You can always send a secure message through Sleep Number.  Sleep Number messages are answered by your nurse or doctor typically within 24 hours.  Please allow extra time on weekends and holidays.        For urgent/emergent questions after business hours, you may reach the on-call GI Fellow by contacting the [facility] at [phone].     How will I get the results of any tests ordered?    You will receive all of your results.  If you have signed up for Sleep Number, any tests ordered at your visit will be available to you after your physician reviews them.  Typically this takes 1-2 weeks.  If there are urgent results that require a change in your care  plan, your physician or nurse will call you to discuss the next steps.      What is [employer]?  Digiting is a secure way for you to access all of your healthcare records from the [facility].  It is a web based computer program, so you can sign on to it from any location.  It also allows you to send secure messages to your care team.  I recommend signing up for Digiting access if you have not already done so and are comfortable with using a computer.      How to I schedule a follow-up visit?  If you did not schedule a follow-up visit today, please call [phone] to schedule a follow-up office visit.        Sincerely,    Annette Portillo PA-C  Division of Gastroenterology, Hepatology & Nutrition  [facility]

## 2019-06-12 NOTE — NURSING NOTE
"  Chief Complaint   Patient presents with     RECHECK     Return     Vitals:    06/12/19 0843   BP: 104/60   Pulse: 70   Temp: 98.8  F (37.1  C)   TempSrc: Oral   SpO2: 100%   Weight: 54.7 kg (120 lb 11.2 oz)   Height: 1.676 m (5' 6\")     Body mass index is 19.48 kg/m .  Tonia uCevas CMA    "

## 2019-06-12 NOTE — LETTER
6/12/2019       RE: Dahiana Puentes  [address] [place] [address]     Dear Colleague,    Thank you for referring your patient, Dahiana Puentes, to the [facility] at [facility]. Please see a copy of my visit note below.    GI CLINIC VISIT    CC/REFERRING MD:  Referred Self  REASON FOR CONSULTATION: follow-up  COLLABORATING MD: Cassi Mckeon     ASSESSMENT/PLAN:  Dahiana Puentes is a 18-year old female with allergic rhinitis, pancreatic cyst (non-diagnostic needle aspiration, with no concerning EUS features, possible serous cystadenoma, followed by Dr ALEIDA Rojo with plan for MRI in 3 years), anxiety, and heart palpitations presenting for follow-up for loose stools.     1. Loose stools  Patient presents for follow-up for chronic loose stools which had acutely worsened in the past couple of years. Previously described one normal [place] scale 4 bowel movement in the morning, followed by a loose urgent bowel movement in the evening with 2 episdoes a month of [place] Scale 7 stools. Has had extensive workup including labs (normal CBC, hgb, TSH, celiac markers, giardia, pancreatic elastase) in addition to normal EGD with no celiac on biopsy, and colonoscopy without evidence of IBD or microscopic colitis on biopsy. Symptoms most likely caused by IBS-D. More recently, symptoms have improved since being home for the summer and she is having less frequent episodes of loose stools, and is typically having two formed non-urgent bowel movements daily. We discussed previous work-up in depth. Also discussed that if symptoms return, she can use more regular fiber supplementation with daily Citrucel, in addition to as needed imodium (1 tablet daily) which has improved symptoms in past. Patient can also consider meeting with our dietitian to discuss potential food triggers and the low FODMAP diet. We also discussed the mind-gut connection and how anxiety is  likely contributing to symptoms- if symptoms return when she goes back to school, would recommend treating aggressively with health psychology or medical therapy if indicated.   -- continue to try and identify food triggers, can consider meeting with dietitian to discuss low FODMAP diet   -- can continue imodium every day if needed    -- can use Citrucel as needed    2. Globus sensation   Patient notes sensation of something in the back of her throat associated with increased refluxant into mouth which has been happening for the past couple of weeks. Denies dysphagia or odynophagia. States this occasionally happens throughout the year and will last for days-weeks at a time. Most recent EGD without mass/esophagitis. Patient has been on prilosec in the past with worsening abdominal pain and is unwilling to try this now. Will plan for zantac 150 mg daily. If symptoms improve, can discontinue and take as needed. Also discussed GERD lifestyle modifications as outlined below.    GERD Lifestyle Modifications  -- Avoid foods high in fat or high fructose corn syrup  -- do not eat within three hours of laying down or going to bed  -- raise the head of your bed 6-8 inches  -- avoid alcohol  -- Zantac 150 mg for sensation in throat     Colorectal cancer screening: no family history of CRC and colonoscopy in 2019 without adenomatous polyps, recommend repeating at age 45-50 or sooner with change or worsening symptoms     RTC as needed     Thank you for this consultation. Patient discussed with Dr. Mcekon. It was a pleasure to participate in the care of this patient; please contact us with any further questions.       Annette Portillo PA-C  Division of Gastroenterology, Hepatology & Nutrition  [facility]        HPI  Dahiana Puentes is a 18-year old female with allergic rhinitis, pancreatic cyst (non-diagnostic needle aspiration, with no concerning EUS features, possible serous cystadenoma, followed by Dr ALEIDA Rojo  with plan for MRI in 3 years), anxiety, and heart palpitations. She has previously been seen by Dr. Mckeon, and this is my first encounter with the patient.      At initial visit, the patient reported loose stools for 5 years which had gotten worse over the past two years. Described pattern as one formed bowel movement in the morning ([place] scale 3-4), sometimes with 1-2 loose stools in the afternoon or evening associated with increased urgency. Once or twice a month she has an episode of 2-4 McMinn 7 stools with urgency.  She hasn't noted any particular trigger for the urgent McMinn 7 episodes. Other symptoms associated with her loose stools includes frequent bloating, urgency, excessive flatus, abdominal cramping, and borborygmi - all of which improve with having a bowel movement. Her bloating also may improve after passing flatus. Sometimes she may have all of these symptoms, or just one at a time.  Patient noted stress, anxiety, and certain food triggers can worsen symptoms (fatty foods, soft-serve ice cream). Has tried gluten-free diet (no appreciable improvement), 1/2 tablet of imodium which does improve symptoms.     She denied blood in the stools, insecurity of passing stools, incomplete emptying, fecal incontinence, and tenesmus. She also denies nausea and vomiting.      Work-up to date includes negative giardia Ag (March 2018), normal fecal elastase (3/6/2018), and normal celiac serology (TTG IgG and IgA with a normal total IgA). An upper endoscopy on 1/23/2018 was essentially normal. Duodenal biopsies were negative for celiac sprue. Notably prior TSH (Dec 2018) and CBC (Dec 2018) were normal. Underwent colonoscopy 5/14/19 with nodular ileal mucosa (biopses with lymphoid aggregates), normal colon, and otherwise normal exam. Biopsies for microscopic colitis negative.     Today the patient notes loose bowel movements have improved since being home for the summer.  She now mentions that she has  "worsening anxiety while at school due to being away from her family.  Currently is having a formed bowel movements on average twice a day.  Urgency has improved.  Will sometimes have soft stool however he states it is not as loose as it is when she is at school.  Continues to describe occasional episodes of bloating and \"upset stomach \".  Most recently this happened about a week ago with corn and hash Stacy.  Has not started daily Citrucel as her symptoms are well controlled.  Continues to take Imodium 1/2 to 1 tablet as needed for event.    Patient describes feeling something in her throat which has happened occasionally in the past.  Most recently started a week ago.  Denies dysphasia or odynophagia.  Associated with some nausea and occasionally feels food and white liquid coming up into her chest.  Notes that she feels clearing her throat more often and will try to swallow more when she feels food coming up into her chest.  This is worsened when she has other GI symptoms.  Has tried Pepto-Bismol for this has not significantly improved her symptoms.  Notes that she has been on Prilosec in the past which made symptoms worse.    ROS:    No fevers or chills  + low grade 99   No weight loss +   No blurry vision, double vision or change in vision  No sore throat  No lymphadenopathy   No headache, paraesthesias, or weakness in a limb  + tension headaches,   + raynads   No shortness of breath or wheezing  No chest pain or pressure  No arthralgias or myalgias  No rashes or skin changes  + dry hands along   No odynophagia or dysphagia  No BRBPR, hematochezia, melena  No dysuria, frequency or urgency  No hot/cold intolerance or polyria  No anxiety at school   + allergies     PROBLEM LIST  There are no active problems to display for this patient.      PERTINENT PAST MEDICAL HISTORY:  Past Medical History:   Diagnosis Date     Allergic rhinitis      Anxiety      Chronic diarrhea      Cyst of pancreas      Dysphagia      " Esophageal reflux      Heart palpitations      Other bladder disorder      Scoliosis      Stomach problems        PREVIOUS SURGERIES:  Past Surgical History:   Procedure Laterality Date     BIOPSY       COLONOSCOPY Left 1/23/2019    Procedure: EGD;  Surgeon: Raul Rojo MD;  Location: [facility]     COLONOSCOPY N/A 5/14/2019    Procedure: COLONOSCOPY, WITH POLYPECTOMY AND BIOPSY;  Surgeon: Soumya Mckeon MD;  Location: [facility] OR     ENDOSCOPIC ULTRASOUND UPPER GASTROINTESTINAL TRACT (GI) N/A 6/9/2016    Procedure: ENDOSCOPIC ULTRASOUND, ESOPHAGOSCOPY / UPPER GASTROINTESTINAL TRACT (GI);  Surgeon: Raul Rojo MD;  Location: [facility] OR       ALLERGIES:  Allergies   Allergen Reactions     Penicillins Hives and Rash     Sulfa Drugs Hives and Rash       PERTINENT MEDICATIONS:    Current Outpatient Medications:      bisacodyl (DULCOLAX) 5 MG EC tablet, Take 2 tablets (10 mg) by mouth daily as needed for constipation, Disp: 4 tablet, Rfl: 0     cetirizine (ZYRTEC ALLERGY) 10 MG tablet, Take 10 mg by mouth as needed, Disp: , Rfl:      Cholecalciferol (VITAMIN D) 2000 UNITS CAPS, Take 2,000 Units by mouth daily, Disp: , Rfl:      clindamycin (CLINDAMAX) 1 % gel, Apply topically 2 times daily, Disp: , Rfl:      Drospirenone-Ethinyl Estradiol (TAMIA PO), 1 tablet Per instructions provided, Disp: , Rfl:      pseudoePHEDrine (SUDAFED) 30 MG tablet, Take by mouth every 4 hours as needed for congestion, Disp: , Rfl:      psyllium (METAMUCIL) 28.3 % POWD, , Disp: , Rfl:      spironolactone (ALDACTONE) 25 MG tablet, Take 25 mg by mouth 2 times daily, Disp: , Rfl:     SOCIAL HISTORY:  No alcohol use, studying psychology at [employer]   Working at [employer]   Social History     Socioeconomic History     Marital status: Single     Spouse name: Not on file     Number of children: Not on file     Years of education: Not on file     Highest education level: Not on file   Occupational History     Not on file  "  Social Needs     Financial resource strain: Not on file     Food insecurity:     Worry: Not on file     Inability: Not on file     Transportation needs:     Medical: Not on file     Non-medical: Not on file   Tobacco Use     Smoking status: Never Smoker     Smokeless tobacco: Never Used   Substance and Sexual Activity     Alcohol use: Never     Alcohol/week: 0.0 oz     Drug use: Never     Sexual activity: Never     Comment: not sexually active   Lifestyle     Physical activity:     Days per week: Not on file     Minutes per session: Not on file     Stress: Not on file   Relationships     Social connections:     Talks on phone: Not on file     Gets together: Not on file     Attends [employer] service: Not on file     Active member of club or organization: Not on file     Attends meetings of clubs or organizations: Not on file     Relationship status: Not on file     Intimate partner violence:     Fear of current or ex partner: Not on file     Emotionally abused: Not on file     Physically abused: Not on file     Forced sexual activity: Not on file   Other Topics Concern     Parent/sibling w/ CABG, MI or angioplasty before 65F 55M? Not Asked   Social History Narrative     Not on file       FAMILY HISTORY:  FH of CRC:None   FH of IBD:None   Grandmother and aunt with IBS-D  Family History   Problem Relation Age of Onset     Asthma Father      Other Cancer Paternal Grandfather        Past/family/social history reviewed and no changes    PHYSICAL EXAMINATION:  Constitutional: aaox3, cooperative, pleasant, not dyspneic/diaphoretic, no acute distress  Vitals reviewed: /60   Pulse 70   Temp 98.8  F (37.1  C) (Oral)   Ht 1.676 m (5' 6\")   Wt 54.7 kg (120 lb 11.2 oz)   SpO2 100%   BMI 19.48 kg/m     Wt:   Wt Readings from Last 2 Encounters:   06/12/19 54.7 kg (120 lb 11.2 oz)   05/14/19 53.5 kg (118 lb)      Eyes: Sclera anicteric/injected  Ears/nose/mouth/throat: Normal oropharynx without ulcers or exudate, mucus " membranes moist, hearing intact  Neck: supple, thyroid normal size  CV: No edema  Respiratory: Unlabored breathing  Lymph: No submandibular, supraclavicular or lymphadenopathy  Abd: Nondistended,no hepatosplenomegaly, nontender, no peritoneal signs  Skin: warm, perfused, no jaundice  Psych: Normal affect  MSK: Normal gait    PERTINENT STUDIES:  Orders Only on 12/17/2018   Component Date Value Ref Range Status     Albumin 12/17/2018 3.8  3.4 - 5.0 g/dL Final     IGA 12/17/2018 117  70 - 380 mg/dL Final     Tissue Transglutaminase Antibody I* 12/17/2018 <1  <7 U/mL Final     Tissue Transglutaminase Tari IgG 12/17/2018 <1  <7 U/mL Final     TSH 12/17/2018 2.38  0.40 - 4.00 mU/L Final     Sed Rate 12/17/2018 5  0 - 20 mm/h Final     WBC 12/17/2018 7.7  4.0 - 11.0 10e9/L Final     RBC Count 12/17/2018 4.64  3.8 - 5.2 10e12/L Final     Hemoglobin 12/17/2018 14.1  11.7 - 15.7 g/dL Final     Hematocrit 12/17/2018 41.8  35.0 - 47.0 % Final     MCV 12/17/2018 90  78 - 100 fl Final     MCH 12/17/2018 30.4  26.5 - 33.0 pg Final     MCHC 12/17/2018 33.7  31.5 - 36.5 g/dL Final     RDW 12/17/2018 11.3  10.0 - 15.0 % Final     Platelet Count 12/17/2018 337  150 - 450 10e9/L Final     Diff Method 12/17/2018 Automated Method   Final     % Neutrophils 12/17/2018 60.0  % Final     % Lymphocytes 12/17/2018 32.7  % Final     % Monocytes 12/17/2018 5.5  % Final     % Eosinophils 12/17/2018 0.7  % Final     % Basophils 12/17/2018 0.7  % Final     % Immature Granulocytes 12/17/2018 0.4  % Final     Nucleated RBCs 12/17/2018 0  0 /100 Final     Absolute Neutrophil 12/17/2018 4.6  1.6 - 8.3 10e9/L Final     Absolute Lymphocytes 12/17/2018 2.5  0.8 - 5.3 10e9/L Final     Absolute Monocytes 12/17/2018 0.4  0.0 - 1.3 10e9/L Final     Absolute Eosinophils 12/17/2018 0.1  0.0 - 0.7 10e9/L Final     Absolute Basophils 12/17/2018 0.1  0.0 - 0.2 10e9/L Final     Abs Immature Granulocytes 12/17/2018 0.0  0 - 0.4 10e9/L Final     Absolute Nucleated RBC  12/17/2018 0.0   Final     Again, thank you for allowing me to participate in the care of your patient.      Sincerely,    Annette Portillo PA-C

## 2019-06-12 NOTE — PROGRESS NOTES
GI CLINIC VISIT    CC/REFERRING MD:  Referred Self  REASON FOR CONSULTATION: follow-up  COLLABORATING MD: Cassi Mckeon     ASSESSMENT/PLAN:  Dahiana Puentes is a 18-year old female with allergic rhinitis, pancreatic cyst (non-diagnostic needle aspiration, with no concerning EUS features, possible serous cystadenoma, followed by Dr ALEIDA Rojo with plan for MRI in 3 years), anxiety, and heart palpitations presenting for follow-up for loose stools.     1. Loose stools  Patient presents for follow-up for chronic loose stools which had acutely worsened in the past couple of years. Previously described one normal [place] scale 4 bowel movement in the morning, followed by a loose urgent bowel movement in the evening with 2 episdoes a month of Wrangell Scale 7 stools. Has had extensive workup including labs (normal CBC, hgb, TSH, celiac markers, giardia, pancreatic elastase) in addition to normal EGD with no celiac on biopsy, and colonoscopy without evidence of IBD or microscopic colitis on biopsy. Symptoms most likely caused by IBS-D. More recently, symptoms have improved since being home for the summer and she is having less frequent episodes of loose stools, and is typically having two formed non-urgent bowel movements daily. We discussed previous work-up in depth. Also discussed that if symptoms return, she can use more regular fiber supplementation with daily Citrucel, in addition to as needed imodium (1 tablet daily) which has improved symptoms in past. Patient can also consider meeting with our dietitian to discuss potential food triggers and the low FODMAP diet. We also discussed the mind-gut connection and how anxiety is likely contributing to symptoms- if symptoms return when she goes back to school, would recommend treating aggressively with health psychology or medical therapy if indicated.   -- continue to try and identify food triggers, can consider meeting with dietitian to discuss low FODMAP diet   -- can  continue imodium every day if needed    -- can use Citrucel as needed    2. Globus sensation   Patient notes sensation of something in the back of her throat associated with increased refluxant into mouth which has been happening for the past couple of weeks. Denies dysphagia or odynophagia. States this occasionally happens throughout the year and will last for days-weeks at a time. Most recent EGD without mass/esophagitis. Patient has been on prilosec in the past with worsening abdominal pain and is unwilling to try this now. Will plan for zantac 150 mg daily. If symptoms improve, can discontinue and take as needed. Also discussed GERD lifestyle modifications as outlined below.    GERD Lifestyle Modifications  -- Avoid foods high in fat or high fructose corn syrup  -- do not eat within three hours of laying down or going to bed  -- raise the head of your bed 6-8 inches  -- avoid alcohol  -- Zantac 150 mg for sensation in throat     Colorectal cancer screening: no family history of CRC and colonoscopy in 2019 without adenomatous polyps, recommend repeating at age 45-50 or sooner with change or worsening symptoms     RTC as needed     Thank you for this consultation. Patient discussed with Dr. Mckeon. It was a pleasure to participate in the care of this patient; please contact us with any further questions.       Annette Portillo PA-C  Division of Gastroenterology, Hepatology & Nutrition  [facility]        HPI  Dahiana Puentes is a 18-year old female with allergic rhinitis, pancreatic cyst (non-diagnostic needle aspiration, with no concerning EUS features, possible serous cystadenoma, followed by Dr ALEIDA Rojo with plan for MRI in 3 years), anxiety, and heart palpitations. She has previously been seen by Dr. Mckeon, and this is my first encounter with the patient.      At initial visit, the patient reported loose stools for 5 years which had gotten worse over the past two years. Described pattern as  one formed bowel movement in the morning (Wake scale 3-4), sometimes with 1-2 loose stools in the afternoon or evening associated with increased urgency. Once or twice a month she has an episode of 2-4 Wake 7 stools with urgency.  She hasn't noted any particular trigger for the urgent Wake 7 episodes. Other symptoms associated with her loose stools includes frequent bloating, urgency, excessive flatus, abdominal cramping, and borborygmi- all of which improve with having a bowel movement. Her bloating also may improve after passing flatus. Sometimes she may have all of these symptoms, or just one at a time. Patient noted stress, anxiety, and certain food triggers can worsen symptoms (fatty foods, soft-serve ice cream). Has tried gluten-free diet (no appreciable improvement), 1/2 tablet of imodium which does improve symptoms.     She denied blood in the stools, insecurity of passing stools, incomplete emptying, fecal incontinence, and tenesmus. She also denies nausea and vomiting.      Work-up to date includes negative giardia Ag (March 2018), normal fecal elastase (3/6/2018), and normal celiac serology (TTG IgG and IgA with a normal total IgA). An upper endoscopy on 1/23/2018 was essentially normal. Duodenal biopsies were negative for celiac sprue. Notably prior TSH (Dec 2018) and CBC (Dec 2018) were normal. Underwent colonoscopy 5/14/19 with nodular ileal mucosa (biopses with lymphoid aggregates), normal colon, and otherwise normal exam. Biopsies for microscopic colitis negative.     Today the patient notes loose bowel movements have improved since being home for the summer.  She now mentions that she has worsening anxiety while at school due to being away from her family.  Currently is having a formed bowel movements on average twice a day.  Urgency has improved.  Will sometimes have soft stool however he states it is not as loose as it is when she is at school.  Continues to describe occasional episodes  "of bloating and \"upset stomach \".  Most recently this happened about a week ago with corn and hash [place].  Has not started daily Citrucel as her symptoms are well controlled.  Continues to take Imodium 1/2 to 1 tablet as needed for event.    Patient describes feeling something in her throat which has happened occasionally in the past.  Most recently started a week ago.  Denies dysphasia or odynophagia.  Associated with some nausea and occasionally feels food and white liquid coming up into her chest.  Notes that she feels clearing her throat more often and will try to swallow more when she feels food coming up into her chest.  This is worsened when she has other GI symptoms.  Has tried Pepto-Bismol for this has not significantly improved her symptoms.  Notes that she has been on Prilosec in the past which made symptoms worse.    ROS:    No fevers or chills  + low grade 99   No weight loss +   No blurry vision, double vision or change in vision  No sore throat  No lymphadenopathy   No headache, paraesthesias, or weakness in a limb  + tension headaches,   + raynads   No shortness of breath or wheezing  No chest pain or pressure  No arthralgias or myalgias  No rashes or skin changes  + dry hands along   No odynophagia or dysphagia  No BRBPR, hematochezia, melena  No dysuria, frequency or urgency  No hot/cold intolerance or polyria  No anxiety at school   + allergies     PROBLEM LIST  There are no active problems to display for this patient.      PERTINENT PAST MEDICAL HISTORY:  Past Medical History:   Diagnosis Date     Allergic rhinitis      Anxiety      Chronic diarrhea      Cyst of pancreas      Dysphagia      Esophageal reflux      Heart palpitations      Other bladder disorder      Scoliosis      Stomach problems        PREVIOUS SURGERIES:  Past Surgical History:   Procedure Laterality Date     BIOPSY       COLONOSCOPY Left 1/23/2019    Procedure: EGD;  Surgeon: Raul Rojo MD;  Location: [facility]     " COLONOSCOPY N/A 5/14/2019    Procedure: COLONOSCOPY, WITH POLYPECTOMY AND BIOPSY;  Surgeon: Soumya Mckeon MD;  Location: [facility] OR     ENDOSCOPIC ULTRASOUND UPPER GASTROINTESTINAL TRACT (GI) N/A 6/9/2016    Procedure: ENDOSCOPIC ULTRASOUND, ESOPHAGOSCOPY / UPPER GASTROINTESTINAL TRACT (GI);  Surgeon: Raul Rojo MD;  Location: [facility] OR       ALLERGIES:  Allergies   Allergen Reactions     Penicillins Hives and Rash     Sulfa Drugs Hives and Rash       PERTINENT MEDICATIONS:    Current Outpatient Medications:      bisacodyl (DULCOLAX) 5 MG EC tablet, Take 2 tablets (10 mg) by mouth daily as needed for constipation, Disp: 4 tablet, Rfl: 0     cetirizine (ZYRTEC ALLERGY) 10 MG tablet, Take 10 mg by mouth as needed, Disp: , Rfl:      Cholecalciferol (VITAMIN D) 2000 UNITS CAPS, Take 2,000 Units by mouth daily, Disp: , Rfl:      clindamycin (CLINDAMAX) 1 % gel, Apply topically 2 times daily, Disp: , Rfl:      Drospirenone-Ethinyl Estradiol (TAMIA PO), 1 tablet Per instructions provided, Disp: , Rfl:      pseudoePHEDrine (SUDAFED) 30 MG tablet, Take by mouth every 4 hours as needed for congestion, Disp: , Rfl:      psyllium (METAMUCIL) 28.3 % POWD, , Disp: , Rfl:      spironolactone (ALDACTONE) 25 MG tablet, Take 25 mg by mouth 2 times daily, Disp: , Rfl:     SOCIAL HISTORY:  No alcohol use, studying psychology at [employer]   Working at [employer]   Social History     Socioeconomic History     Marital status: Single     Spouse name: Not on file     Number of children: Not on file     Years of education: Not on file     Highest education level: Not on file   Occupational History     Not on file   Social Needs     Financial resource strain: Not on file     Food insecurity:     Worry: Not on file     Inability: Not on file     Transportation needs:     Medical: Not on file     Non-medical: Not on file   Tobacco Use     Smoking status: Never Smoker     Smokeless tobacco: Never Used   Substance and Sexual  "Activity     Alcohol use: Never     Alcohol/week: 0.0 oz     Drug use: Never     Sexual activity: Never     Comment: not sexually active   Lifestyle     Physical activity:     Days per week: Not on file     Minutes per session: Not on file     Stress: Not on file   Relationships     Social connections:     Talks on phone: Not on file     Gets together: Not on file     Attends Spiritism service: Not on file     Active member of club or organization: Not on file     Attends meetings of clubs or organizations: Not on file     Relationship status: Not on file     Intimate partner violence:     Fear of current or ex partner: Not on file     Emotionally abused: Not on file     Physically abused: Not on file     Forced sexual activity: Not on file   Other Topics Concern     Parent/sibling w/ CABG, MI or angioplasty before 65F 55M? Not Asked   Social History Narrative     Not on file       FAMILY HISTORY:  FH of CRC:None   FH of IBD:None   Grandmother and aunt with IBS-D  Family History   Problem Relation Age of Onset     Asthma Father      Other Cancer Paternal Grandfather        Past/family/social history reviewed and no changes    PHYSICAL EXAMINATION:  Constitutional: aaox3, cooperative, pleasant, not dyspneic/diaphoretic, no acute distress  Vitals reviewed: /60   Pulse 70   Temp 98.8  F (37.1  C) (Oral)   Ht 1.676 m (5' 6\")   Wt 54.7 kg (120 lb 11.2 oz)   SpO2 100%   BMI 19.48 kg/m    Wt:   Wt Readings from Last 2 Encounters:   06/12/19 54.7 kg (120 lb 11.2 oz)   05/14/19 53.5 kg (118 lb)      Eyes: Sclera anicteric/injected  Ears/nose/mouth/throat: Normal oropharynx without ulcers or exudate, mucus membranes moist, hearing intact  Neck: supple, thyroid normal size  CV: No edema  Respiratory: Unlabored breathing  Lymph: No submandibular, supraclavicular or lymphadenopathy  Abd: Nondistended,no hepatosplenomegaly, nontender, no peritoneal signs  Skin: warm, perfused, no jaundice  Psych: Normal affect  MSK: " Normal gait    PERTINENT STUDIES:  Orders Only on 12/17/2018   Component Date Value Ref Range Status     Albumin 12/17/2018 3.8  3.4 - 5.0 g/dL Final     IGA 12/17/2018 117  70 - 380 mg/dL Final     Tissue Transglutaminase Antibody I* 12/17/2018 <1  <7 U/mL Final     Tissue Transglutaminase Tari IgG 12/17/2018 <1  <7 U/mL Final     TSH 12/17/2018 2.38  0.40 - 4.00 mU/L Final     Sed Rate 12/17/2018 5  0 - 20 mm/h Final     WBC 12/17/2018 7.7  4.0 - 11.0 10e9/L Final     RBC Count 12/17/2018 4.64  3.8 - 5.2 10e12/L Final     Hemoglobin 12/17/2018 14.1  11.7 - 15.7 g/dL Final     Hematocrit 12/17/2018 41.8  35.0 - 47.0 % Final     MCV 12/17/2018 90  78 - 100 fl Final     MCH 12/17/2018 30.4  26.5 - 33.0 pg Final     MCHC 12/17/2018 33.7  31.5 - 36.5 g/dL Final     RDW 12/17/2018 11.3  10.0 - 15.0 % Final     Platelet Count 12/17/2018 337  150 - 450 10e9/L Final     Diff Method 12/17/2018 Automated Method   Final     % Neutrophils 12/17/2018 60.0  % Final     % Lymphocytes 12/17/2018 32.7  % Final     % Monocytes 12/17/2018 5.5  % Final     % Eosinophils 12/17/2018 0.7  % Final     % Basophils 12/17/2018 0.7  % Final     % Immature Granulocytes 12/17/2018 0.4  % Final     Nucleated RBCs 12/17/2018 0  0 /100 Final     Absolute Neutrophil 12/17/2018 4.6  1.6 - 8.3 10e9/L Final     Absolute Lymphocytes 12/17/2018 2.5  0.8 - 5.3 10e9/L Final     Absolute Monocytes 12/17/2018 0.4  0.0 - 1.3 10e9/L Final     Absolute Eosinophils 12/17/2018 0.1  0.0 - 0.7 10e9/L Final     Absolute Basophils 12/17/2018 0.1  0.0 - 0.2 10e9/L Final     Abs Immature Granulocytes 12/17/2018 0.0  0 - 0.4 10e9/L Final     Absolute Nucleated RBC 12/17/2018 0.0   Final     Answers for HPI/ROS submitted by the patient on 6/10/2019   PHQ9 TOTAL SCORE: 0  General Symptoms: No  Skin Symptoms: No  HENT Symptoms: No  EYE SYMPTOMS: No  HEART SYMPTOMS: No  LUNG SYMPTOMS: No  INTESTINAL SYMPTOMS: Yes  URINARY SYMPTOMS: No  GYNECOLOGIC SYMPTOMS: No  BREAST  SYMPTOMS: No  SKELETAL SYMPTOMS: No  BLOOD SYMPTOMS: No  NERVOUS SYSTEM SYMPTOMS: No  MENTAL HEALTH SYMPTOMS: No  Heart burn or indigestion: No  Nausea: No  Vomiting: No  Abdominal pain: No  Bloating: Yes  Constipation: No  Diarrhea: No  Blood in stool: No  Black stools: No  Rectal or Anal pain: No  Fecal incontinence: No  Yellowing of skin or eyes: No  Vomit with blood: No  Change in stools: Yes

## 2020-03-10 ENCOUNTER — HEALTH MAINTENANCE LETTER (OUTPATIENT)
Age: 20
End: 2020-03-10

## 2020-04-25 NOTE — PROGRESS NOTES
"Care Coordination Telephone Call  GI Service and Surgical Oncology    Called patient to discuss whether or not she has been able to sign release of information for Dr. Rojo to speak with her mother.  She said that she has not had time today and has given me verbal consent for Dr. Rojo to speak with her mother \"as I just don't understand the things that he may talk about.\"  I have informed her that written consent must be done but that I will give  this information so that he can talk with her mother and she voiced agreement.    I have asked the patient to call with any additional questions or concerns and have provided my contact information.    Priti GARCIAN, HNBC, STAR-T  RN Care Coordinator  Surgical Oncology and GI service  Ph: [phone]  FAX: [phone]          " 25-Apr-2020 16:07

## 2020-12-27 ENCOUNTER — HEALTH MAINTENANCE LETTER (OUTPATIENT)
Age: 20
End: 2020-12-27

## 2021-04-24 ENCOUNTER — HEALTH MAINTENANCE LETTER (OUTPATIENT)
Age: 21
End: 2021-04-24

## 2021-09-23 NOTE — TELEPHONE ENCOUNTER
Called and left message reminding patient of appointment for 6/12/19 at 9AM.   Called patient spoke with daughter Tita informed of Abnormal presence of protein in urine, drink plenty of water, low-sodium diet, avoid over-the-counter NSAIDs (ibuprofen or naproxen) continue with lisinopril   Tita verbalized understanding and grateful for call.

## 2021-10-04 ENCOUNTER — HEALTH MAINTENANCE LETTER (OUTPATIENT)
Age: 21
End: 2021-10-04

## 2021-12-20 ENCOUNTER — PATIENT OUTREACH (OUTPATIENT)
Dept: GASTROENTEROLOGY | Facility: CLINIC | Age: 21
End: 2021-12-20
Payer: COMMERCIAL

## 2021-12-20 DIAGNOSIS — K86.2 PANCREAS CYST: Primary | ICD-10-CM

## 2021-12-20 NOTE — PROGRESS NOTES
Called pt to discuss 3 year follow up MRI and clinic visit with Dr Rojo , left     Gisella Perez, RN, BSN,   [facility]  Care coordinator

## 2022-03-21 ENCOUNTER — OFFICE VISIT (OUTPATIENT)
Dept: GASTROENTEROLOGY | Facility: CLINIC | Age: 22
End: 2022-03-21
Attending: INTERNAL MEDICINE
Payer: COMMERCIAL

## 2022-03-21 ENCOUNTER — TELEPHONE (OUTPATIENT)
Dept: GASTROENTEROLOGY | Facility: CLINIC | Age: 22
End: 2022-03-21

## 2022-03-21 ENCOUNTER — ANCILLARY PROCEDURE (OUTPATIENT)
Dept: MRI IMAGING | Facility: CLINIC | Age: 22
End: 2022-03-21
Attending: INTERNAL MEDICINE
Payer: COMMERCIAL

## 2022-03-21 VITALS
TEMPERATURE: 98.3 F | SYSTOLIC BLOOD PRESSURE: 115 MMHG | BODY MASS INDEX: 18.55 KG/M2 | OXYGEN SATURATION: 100 % | DIASTOLIC BLOOD PRESSURE: 72 MMHG | HEART RATE: 93 BPM | WEIGHT: 114.9 LBS

## 2022-03-21 DIAGNOSIS — K86.2 PANCREAS CYST: Primary | ICD-10-CM

## 2022-03-21 DIAGNOSIS — K86.2 PANCREAS CYST: ICD-10-CM

## 2022-03-21 PROCEDURE — 74183 MRI ABD W/O CNTR FLWD CNTR: CPT | Performed by: RADIOLOGY

## 2022-03-21 PROCEDURE — A9585 GADOBUTROL INJECTION: HCPCS | Performed by: RADIOLOGY

## 2022-03-21 PROCEDURE — G0463 HOSPITAL OUTPT CLINIC VISIT: HCPCS

## 2022-03-21 PROCEDURE — 99213 OFFICE O/P EST LOW 20 MIN: CPT | Performed by: INTERNAL MEDICINE

## 2022-03-21 RX ORDER — GADOBUTROL 604.72 MG/ML
7.5 INJECTION INTRAVENOUS ONCE
Status: COMPLETED | OUTPATIENT
Start: 2022-03-21 | End: 2022-03-21

## 2022-03-21 RX ORDER — TRIAMCINOLONE ACETONIDE 1 MG/G
CREAM TOPICAL
COMMUNITY
Start: 2021-08-03 | End: 2024-06-24

## 2022-03-21 RX ADMIN — GADOBUTROL 5.5 ML: 604.72 INJECTION INTRAVENOUS at 08:37

## 2022-03-21 ASSESSMENT — PAIN SCALES - GENERAL: PAINLEVEL: NO PAIN (0)

## 2022-03-21 NOTE — TELEPHONE ENCOUNTER
Cata -    Clinic visit was today. Awaiting official MRI report but to me appears stable.    Please arrange for:  1) Review at next multidisciplinary pancreatic tumor conference. Ind - pancreatic cyst with low CEA and in young female.  2) Tentative plan for repeat MRI/MRCP with contrast in 2 years with clinic visit same day.    Will adjust if needed based on final MRI report or conference.    SOFIA Rojo MD  Professor of Medicine  Division of Gastroenterology, Hepatology and Nutrition  [facility]

## 2022-03-21 NOTE — LETTER
"    3/21/2022         RE: Dahiana Puentes  [address] [address]        Dear Colleague,    Thank you for referring your patient, Dahiana Puentes, to the [facility]. Please see a copy of my visit note below.    [facility]  GASTROENTEROLOGY PROGRESS NOTE  Dahiana Puentes [id number]     SUBJECTIVE:  20 yo female being seen for pancreatic cyst surveillance.   Last seen 12/17/18 at which time 3 year surveillance was recommended. Note from that visit as follows:    \"The patient is a 18-year-old white female who I am seeing today for followup of an incidentally identified cystic lesion in the pancreas and liver lesion.  The pancreatic cyst was incidentally identified on abdominal imaging during an evaluation for urologic symptoms.  Subsequently, she was referred and underwent endoscopic ultrasound by myself on 06/09/2016.  This showed a single 16x6 mm cystic lesion in the pancreatic head, which was simple in appearance.  There were no solid components and there were no features of chronic pancreatitis.  Needle aspiration was performed and CEA level in the cyst fluid was 2.5 with a relatively low amylase level of 898 and benign nondiagnostic cytology.  Since that time she has undergone surveillance imaging for the possible diagnosis of intraductal papillary mucinous neoplasm or other neoplastic cysts.  She had an MRI of the abdomen in 08/2017, which showed that the cyst was stable but incidentally identified a lesion in the liver.  It was unclear on imaging whether this could represent a hepatic adenoma or focal nodular hyperplasia.  Her case was discussed in a multidisciplinary liver conference and recommendation made for MRI with Eovist.  This was performed on 03/03/2018 and at that time the lesion was felt to be most consistent with focal nodular hyperplasia.  She was taking oral contraceptives and it was felt that this was safe to continue.      She is here today " "for followup of a recent MRI for surveillance of the cyst and liver lesion.  This was performed on 12/12/2018.  This reported that the pancreatic cyst is unchanged in appearance.  She was incidentally noted to have pancreas divisum.  There was noted to be decreased conspicuity of the liver lesion.      At her time of her last visit, she was complaining of abdominal bloating and loose stools.  At that time we did check a stool for Giardia specific antigen as well as fecal elastase and these were normal.  She continues to complain of these issues and states it is significantly interfering with her quality of life.  She describes episodes of loose stools, perhaps once or twice a week with 1-2 loose stools per day.  There is no blood in the stools.  She describes abdominal cramping and \"gassiness\" but does not have significant increased flatulence.  She has tried multiple dietary changes including a prolonged period of gluten-free diet without improvement.  She currently is avoiding greasy foods.  She is not having documented fevers.  She has tried Metamucil for approximately a week and a half without change in symptoms.  She feels slightly improved with Pepto-Bismol.      In addition to these symptoms she complains of dry hands and reported Raynaud's phenomenon.  She has intermittent headaches and suffers from anxiety.  She brought printed literature today regarding possible autoimmune inflammatory conditions of the GI tract and is concerned that this is somehow autoimmune.  Her mother has Raynaud phenomenon but otherwise there is no family history of celiac disease, ulcerative colitis, Crohn's disease or other autoimmune disorders.      The patient by report had serologic testing for celiac disease many years ago, but I do not have these records.      She states that her symptoms do not seem to correlate with times of stress and she is not identifying obvious precipitating factors.      OBJECTIVE:  Vital signs are " documented in Epic.  The patient is awake, alert, in no acute distress.  Her abdomen shows normal bowel sounds, and is otherwise soft and benign.  There is no tenderness, mass, rebound, guarding or rigidity.  Skin shows no focal lesions in the hands.      ASSESSMENT AND PLAN:   1.  Pancreatic cyst.  The needle aspiration from the cyst was nondiagnostic, but potentially suggestive of a benign lesion such as a serous cystadenoma.  Given the low negative predictive value of needle aspiration to exclude significant mucinous lesions, I did recommend that we continue surveillance imaging.  We discussed potential repeat testing including such tests as DNA analysis (RedPath testing) or Moray forceps biopsies; however, I am not sure that these are necessary.  In general, these can confirm a diagnosis of a clinically significant lesion but do not necessarily trigger surgical resection and negative testing would not definitively preclude the need for surveillance imaging.  After discussion, it was recommended she have repeat MRI of the abdomen in 3 years for further surveillance.  We will meet with her at that time and discuss any new literature or changes in published guidelines.   2.  Liver lesion.  This is felt to be most consistent with focal nodular hyperplasia and is stable on imaging.  I believe she can continue taking her oral contraceptives and this does not specifically require further surveillance, but will be followed at the time of her pancreatic cyst followup.   3.  Intermittent diarrhea, cramping and gassiness.  I suspect that this is irritable bowel syndrome.  Differential diagnosis would include celiac disease or inflammatory bowel disease.  She is concerned about autoimmune enteropathy which is also in the differential, but relatively uncommon.  Her dry skin could potentially also represent thyroid dysfunction.  My recommendation at this time would be to check labs including serology for celiac disease,  "thyroid stimulating hormone level and albumin.  We will check a complete blood count to exclude anemia.  I will plan on an endoscopy with small bowel biopsy.  At some point she may need more specific testing for autoimmune dysfunction; however, for now I will check a sedimentation rate to screen for obvious inflammatory bowel disease.  I would like ideally to have her meet one of my colleagues who focuses more on luminal gastrointestinal disorders and functional diseases of the GI tract and therefore we will pursue a referral to Dr. Mckeon.  I will arrange for further evaluation if necessary based on the lab testing.  In the meantime, I suggested that she continue with her low-fat diet and that she could try Imodium 1 tablet a day or one-half tablet a day as needed to see if this will control her loose stools.\"     Since I last saw her she is doing well. She is now in a [employer] of [employer] Program on weekends and working as a  during the week.    The GI symptoms (diarrhea, cramping and gassiness) that were being followed in luminal clinic have resolved. She did have normal TSH and celiac serology 12/17/18.    Over this past summer she lost about 10 lb (from 118 to 108 lb) and had workup through her primary care that was unrevealing. I don't have the records however did confirm that TSH was normal and she had no anemia. She attributed this to stress. She has now regained most of the weight back.  She denies abdominal pain, nausea, vomiting, diarrhea or jaundice.    OBJECTIVE:  VS: /72 (BP Location: Right arm, Patient Position: Sitting, Cuff Size: Adult Small)   Pulse 93   Temp 98.3  F (36.8  C) (Oral)   Wt 52.1 kg (114 lb 14.4 oz)   LMP 03/13/2022 (Exact Date)   SpO2 100%   Breastfeeding No   BMI 18.55 kg/m     GEN: A&Ox3, NAD, comfortable  CV:  RRR, no M/G/R  PULM:  CTAB  ABD: normal BS, soft, NT/ND    MRI was performed immediately prior to this visit and had not been officially read. " By my review the cyst appears stable measuring 18 x 7 mm (vs 18 x 9 in same plane in 2018) and I do not see any enhancing nodules or duct dilation.    IMPRESSION:  Dahiana Puentes is a 21 year old female with an incidentally-identified pancreatic cyst in the pancreatic head. Prior needle aspiration reassuring with low CEA, benign cytology and no string sign, and this is most likely to represent a benign cyst such as a serous cystadenoma, however we discussed again that the fluid analysis is not 100% accurate. Definitive diagnosis would require surgical resection and I do not believe that this is warranted based on the available data, however I acknowledged the non-ideal situation of considering long-term surveillance in a young pt. We again discussed that the AGA guidelines recommend stopping surveillance after 5 years, however these have been roundly criticized.    At this time, presuming official MRI read is stable, will continue with surveillance by MRI. We discussed that there is significant interest in this clinical question of stopping surveillance and that the next iteration of international consensus guidelines may well address this.    RECOMMENDATIONS:  1) Await official MRI read. Will address as appropriate if unexpected findings.  2) Will present at multidisciplinary pancreatic tumor conference.  3) Tentative plan for repeat MRI in 2 years.    It was a pleasure to participate in the care of this patient; please contact us with any further questions.     SOFIA Rojo MD  Professor of Medicine  Division of Gastroenterology, Hepatology and Nutrition  [facility]  3/21/2022        Again, thank you for allowing me to participate in the care of your patient.        Sincerely,        Raul Rojo MD

## 2022-03-21 NOTE — PROGRESS NOTES
"[facility]  GASTROENTEROLOGY PROGRESS NOTE  Dahiana Puentes [id number]     SUBJECTIVE:  22 yo female being seen for pancreatic cyst surveillance.   Last seen 12/17/18 at which time 3 year surveillance was recommended. Note from that visit as follows:    \"The patient is a 18-year-old white female who I am seeing today for followup of an incidentally identified cystic lesion in the pancreas and liver lesion.  The pancreatic cyst was incidentally identified on abdominal imaging during an evaluation for urologic symptoms.  Subsequently, she was referred and underwent endoscopic ultrasound by myself on 06/09/2016.  This showed a single 16x6 mm cystic lesion in the pancreatic head, which was simple in appearance.  There were no solid components and there were no features of chronic pancreatitis.  Needle aspiration was performed and CEA level in the cyst fluid was 2.5 with a relatively low amylase level of 898 and benign nondiagnostic cytology.  Since that time she has undergone surveillance imaging for the possible diagnosis of intraductal papillary mucinous neoplasm or other neoplastic cysts.  She had an MRI of the abdomen in 08/2017, which showed that the cyst was stable but incidentally identified a lesion in the liver.  It was unclear on imaging whether this could represent a hepatic adenoma or focal nodular hyperplasia.  Her case was discussed in a multidisciplinary liver conference and recommendation made for MRI with Eovist.  This was performed on 03/03/2018 and at that time the lesion was felt to be most consistent with focal nodular hyperplasia.  She was taking oral contraceptives and it was felt that this was safe to continue.      She is here today for followup of a recent MRI for surveillance of the cyst and liver lesion.  This was performed on 12/12/2018.  This reported that the pancreatic cyst is unchanged in appearance.  She was incidentally noted to have pancreas divisum.  There was noted to be " "decreased conspicuity of the liver lesion.      At her time of her last visit, she was complaining of abdominal bloating and loose stools.  At that time we did check a stool for Giardia specific antigen as well as fecal elastase and these were normal.  She continues to complain of these issues and states it is significantly interfering with her quality of life.  She describes episodes of loose stools, perhaps once or twice a week with 1-2 loose stools per day.  There is no blood in the stools.  She describes abdominal cramping and \"gassiness\" but does not have significant increased flatulence.  She has tried multiple dietary changes including a prolonged period of gluten-free diet without improvement.  She currently is avoiding greasy foods.  She is not having documented fevers.  She has tried Metamucil for approximately a week and a half without change in symptoms.  She feels slightly improved with Pepto-Bismol.      In addition to these symptoms she complains of dry hands and reported Raynaud's phenomenon.  She has intermittent headaches and suffers from anxiety.  She brought printed literature today regarding possible autoimmune inflammatory conditions of the GI tract and is concerned that this is somehow autoimmune.  Her mother has Raynaud phenomenon but otherwise there is no family history of celiac disease, ulcerative colitis, Crohn's disease or other autoimmune disorders.      The patient by report had serologic testing for celiac disease many years ago, but I do not have these records.      She states that her symptoms do not seem to correlate with times of stress and she is not identifying obvious precipitating factors.      OBJECTIVE:  Vital signs are documented in Epic.  The patient is awake, alert, in no acute distress.  Her abdomen shows normal bowel sounds, and is otherwise soft and benign.  There is no tenderness, mass, rebound, guarding or rigidity.  Skin shows no focal lesions in the hands.    "   ASSESSMENT AND PLAN:   1.  Pancreatic cyst.  The needle aspiration from the cyst was nondiagnostic, but potentially suggestive of a benign lesion such as a serous cystadenoma.  Given the low negative predictive value of needle aspiration to exclude significant mucinous lesions, I did recommend that we continue surveillance imaging.  We discussed potential repeat testing including such tests as DNA analysis (RedPath testing) or Moray forceps biopsies; however, I am not sure that these are necessary.  In general, these can confirm a diagnosis of a clinically significant lesion but do not necessarily trigger surgical resection and negative testing would not definitively preclude the need for surveillance imaging.  After discussion, it was recommended she have repeat MRI of the abdomen in 3 years for further surveillance.  We will meet with her at that time and discuss any new literature or changes in published guidelines.   2.  Liver lesion.  This is felt to be most consistent with focal nodular hyperplasia and is stable on imaging.  I believe she can continue taking her oral contraceptives and this does not specifically require further surveillance, but will be followed at the time of her pancreatic cyst followup.   3.  Intermittent diarrhea, cramping and gassiness.  I suspect that this is irritable bowel syndrome.  Differential diagnosis would include celiac disease or inflammatory bowel disease.  She is concerned about autoimmune enteropathy which is also in the differential, but relatively uncommon.  Her dry skin could potentially also represent thyroid dysfunction.  My recommendation at this time would be to check labs including serology for celiac disease, thyroid stimulating hormone level and albumin.  We will check a complete blood count to exclude anemia.  I will plan on an endoscopy with small bowel biopsy.  At some point she may need more specific testing for autoimmune dysfunction; however, for now I will  "check a sedimentation rate to screen for obvious inflammatory bowel disease.  I would like ideally to have her meet one of my colleagues who focuses more on luminal gastrointestinal disorders and functional diseases of the GI tract and therefore we will pursue a referral to Dr. Mckeon.  I will arrange for further evaluation if necessary based on the lab testing.  In the meantime, I suggested that she continue with her low-fat diet and that she could try Imodium 1 tablet a day or one-half tablet a day as needed to see if this will control her loose stools.\"     Since I last saw her she is doing well. She is now in a [employer] of [employer] Program on weekends and working as a  during the week.    The GI symptoms (diarrhea, cramping and gassiness) that were being followed in luminal clinic have resolved. She did have normal TSH and celiac serology 12/17/18.    Over this past summer she lost about 10 lb (from 118 to 108 lb) and had workup through her primary care that was unrevealing. I don't have the records however did confirm that TSH was normal and she had no anemia. She attributed this to stress. She has now regained most of the weight back.  She denies abdominal pain, nausea, vomiting, diarrhea or jaundice.    OBJECTIVE:  VS: /72 (BP Location: Right arm, Patient Position: Sitting, Cuff Size: Adult Small)   Pulse 93   Temp 98.3  F (36.8  C) (Oral)   Wt 52.1 kg (114 lb 14.4 oz)   LMP 03/13/2022 (Exact Date)   SpO2 100%   Breastfeeding No   BMI 18.55 kg/m     GEN: A&Ox3, NAD, comfortable  CV:  RRR, no M/G/R  PULM:  CTAB  ABD: normal BS, soft, NT/ND    MRI was performed immediately prior to this visit and had not been officially read. By my review the cyst appears stable measuring 18 x 7 mm (vs 18 x 9 in same plane in 2018) and I do not see any enhancing nodules or duct dilation.    IMPRESSION:  Dahiana Puentes is a 21 year old female with an incidentally-identified pancreatic cyst in the " pancreatic head. Prior needle aspiration reassuring with low CEA, benign cytology and no string sign, and this is most likely to represent a benign cyst such as a serous cystadenoma, however we discussed again that the fluid analysis is not 100% accurate. Definitive diagnosis would require surgical resection and I do not believe that this is warranted based on the available data, however I acknowledged the non-ideal situation of considering long-term surveillance in a young pt. We again discussed that the AGA guidelines recommend stopping surveillance after 5 years, however these have been roundly criticized.    At this time, presuming official MRI read is stable, will continue with surveillance by MRI. We discussed that there is significant interest in this clinical question of stopping surveillance and that the next iteration of international consensus guidelines may well address this.    RECOMMENDATIONS:  1) Await official MRI read. Will address as appropriate if unexpected findings.  2) Will present at multidisciplinary pancreatic tumor conference.  3) Tentative plan for repeat MRI in 2 years.    It was a pleasure to participate in the care of this patient; please contact us with any further questions.     SOFIA Rojo MD  Professor of Medicine  Division of Gastroenterology, Hepatology and Nutrition  [facility]  3/21/2022

## 2022-03-21 NOTE — DISCHARGE INSTRUCTIONS
MRI Contrast Discharge Instructions    The IV contrast you received today will pass out of your body in your  urine. This will happen in the next 24 hours. You will not feel this process.  Your urine will not change color.    Drink at least 4 extra glasses of water or juice today (unless your doctor  has restricted your fluids). This reduces the stress on your kidneys.  You may take your regular medicines.    If you are on dialysis: It is best to have dialysis today.    If you have a reaction: Most reactions happen right away. If you have  any new symptoms after leaving the hospital (such as hives or swelling),  call your hospital at the correct number below. Or call your family doctor.  If you have breathing distress or wheezing, call 911.    Special instructions: ***    I have read and understand the above information.    Signature:______________________________________ Date:___________    Staff:__________________________________________ Date:___________     Time:__________    [place] [facility] Departments:    ___[place]: [phone]  ___[facility]: [phone]  ___[place]: [phone] ___[facility]: [phone]  ___[facility]: [phone]  ___[facility]: [phone]  ___[phone]  ___[facility]: [phone]  ___[phone]

## 2022-03-21 NOTE — LETTER
Date:March 21, 2022      Patient was self referred, no letter generated. Do not send.        [facility] Health Information

## 2022-03-21 NOTE — NURSING NOTE
"Oncology Rooming Note    March 21, 2022 9:25 AM   Dahiana Puentes is a 21 year old female who presents for:    Chief Complaint   Patient presents with     Oncology Clinic Visit     Pancreas cyst      Initial Vitals: /72 (BP Location: Right arm, Patient Position: Sitting, Cuff Size: Adult Small)   Pulse 93   Temp 98.3  F (36.8  C) (Oral)   Wt 52.1 kg (114 lb 14.4 oz)   LMP 03/13/2022 (Exact Date)   SpO2 100%   Breastfeeding No   BMI 18.55 kg/m   Estimated body mass index is 18.55 kg/m  as calculated from the following:    Height as of 6/12/19: 1.676 m (5' 6\").    Weight as of this encounter: 52.1 kg (114 lb 14.4 oz). Body surface area is 1.56 meters squared.  No Pain (0) Comment: Data Unavailable   Patient's last menstrual period was 03/13/2022 (exact date).  Allergies reviewed: Yes  Medications reviewed: Yes    Medications: Medication refills not needed today.  Pharmacy name entered into [facility]: [facility] [place] [place] [address] [address]    Clinical concerns: None    Jhonatan Crespo            "

## 2022-04-04 ENCOUNTER — TUMOR CONFERENCE (OUTPATIENT)
Dept: ONCOLOGY | Facility: CLINIC | Age: 22
End: 2022-04-04
Payer: COMMERCIAL

## 2022-05-15 ENCOUNTER — HEALTH MAINTENANCE LETTER (OUTPATIENT)
Age: 22
End: 2022-05-15

## 2022-09-11 ENCOUNTER — HEALTH MAINTENANCE LETTER (OUTPATIENT)
Age: 22
End: 2022-09-11

## 2023-06-03 ENCOUNTER — HEALTH MAINTENANCE LETTER (OUTPATIENT)
Age: 23
End: 2023-06-03

## 2023-09-07 DIAGNOSIS — R63.4 WEIGHT LOSS: Primary | ICD-10-CM

## 2023-09-07 DIAGNOSIS — K86.2 PANCREAS CYST: ICD-10-CM

## 2023-09-07 DIAGNOSIS — R19.5 LOOSE STOOLS: ICD-10-CM

## 2023-09-29 ENCOUNTER — LAB (OUTPATIENT)
Dept: LAB | Facility: CLINIC | Age: 23
End: 2023-09-29
Payer: COMMERCIAL

## 2023-09-29 DIAGNOSIS — R19.5 LOOSE STOOLS: ICD-10-CM

## 2023-09-29 DIAGNOSIS — K86.2 PANCREAS CYST: ICD-10-CM

## 2023-09-29 DIAGNOSIS — R63.4 WEIGHT LOSS: ICD-10-CM

## 2023-09-29 LAB — LIPASE SERPL-CCNC: 19 U/L (ref 13–60)

## 2023-09-29 PROCEDURE — 83690 ASSAY OF LIPASE: CPT

## 2023-09-29 PROCEDURE — 36415 COLL VENOUS BLD VENIPUNCTURE: CPT

## 2023-10-09 ENCOUNTER — APPOINTMENT (OUTPATIENT)
Dept: LAB | Facility: CLINIC | Age: 23
End: 2023-10-09
Payer: COMMERCIAL

## 2023-10-09 PROCEDURE — 87338 HPYLORI STOOL AG IA: CPT

## 2023-10-09 PROCEDURE — 82653 EL-1 FECAL QUANTITATIVE: CPT

## 2023-10-10 LAB — H PYLORI AG STL QL IA: NEGATIVE

## 2023-10-11 LAB — ELASTASE PANC STL-MCNT: >800 UG/G

## 2024-01-09 DIAGNOSIS — K86.2 PANCREAS CYST: Primary | ICD-10-CM

## 2024-01-10 ENCOUNTER — TELEPHONE (OUTPATIENT)
Dept: GASTROENTEROLOGY | Facility: CLINIC | Age: 24
End: 2024-01-10
Payer: COMMERCIAL

## 2024-01-10 NOTE — TELEPHONE ENCOUNTER
Left Voicemail (1st Attempt) and Sent Mychart (1st Attempt) for the patient to call back and schedule the following:    Appointment type: Return Pt  Provider: Dr Rojo  Return date: Next Available  Specialty phone number: [phone] (left direct line)  Additional appointment(s) needed: MRI not more than 1 week prior to appt  Additonal Notes: N/A

## 2024-01-15 ENCOUNTER — TELEPHONE (OUTPATIENT)
Dept: GASTROENTEROLOGY | Facility: CLINIC | Age: 24
End: 2024-01-15
Payer: COMMERCIAL

## 2024-01-15 NOTE — TELEPHONE ENCOUNTER
Left Voicemail (2nd Attempt) for the patient to call back and schedule the following:    Appointment type: clinic visit  Provider: Dr. Rojo  Return date: Next available  Specialty phone number: [phone]  Additional appointment(s) needed:   Additonal Notes:

## 2024-06-24 ENCOUNTER — OFFICE VISIT (OUTPATIENT)
Dept: GASTROENTEROLOGY | Facility: CLINIC | Age: 24
End: 2024-06-24
Attending: INTERNAL MEDICINE
Payer: COMMERCIAL

## 2024-06-24 ENCOUNTER — ANCILLARY PROCEDURE (OUTPATIENT)
Dept: MRI IMAGING | Facility: CLINIC | Age: 24
End: 2024-06-24
Attending: INTERNAL MEDICINE
Payer: COMMERCIAL

## 2024-06-24 VITALS
TEMPERATURE: 98.9 F | BODY MASS INDEX: 17.51 KG/M2 | HEART RATE: 86 BPM | RESPIRATION RATE: 20 BRPM | DIASTOLIC BLOOD PRESSURE: 65 MMHG | SYSTOLIC BLOOD PRESSURE: 99 MMHG | OXYGEN SATURATION: 100 % | WEIGHT: 108.5 LBS

## 2024-06-24 DIAGNOSIS — K86.2 PANCREAS CYST: Primary | ICD-10-CM

## 2024-06-24 DIAGNOSIS — K86.2 PANCREAS CYST: ICD-10-CM

## 2024-06-24 PROCEDURE — 74183 MRI ABD W/O CNTR FLWD CNTR: CPT | Performed by: STUDENT IN AN ORGANIZED HEALTH CARE EDUCATION/TRAINING PROGRAM

## 2024-06-24 PROCEDURE — 99214 OFFICE O/P EST MOD 30 MIN: CPT | Performed by: INTERNAL MEDICINE

## 2024-06-24 PROCEDURE — 99212 OFFICE O/P EST SF 10 MIN: CPT | Performed by: INTERNAL MEDICINE

## 2024-06-24 PROCEDURE — A9585 GADOBUTROL INJECTION: HCPCS | Mod: JZ | Performed by: STUDENT IN AN ORGANIZED HEALTH CARE EDUCATION/TRAINING PROGRAM

## 2024-06-24 RX ORDER — GADOBUTROL 604.72 MG/ML
7.5 INJECTION INTRAVENOUS ONCE
Status: COMPLETED | OUTPATIENT
Start: 2024-06-24 | End: 2024-06-24

## 2024-06-24 RX ORDER — PROPRANOLOL HYDROCHLORIDE 10 MG/1
TABLET ORAL
COMMUNITY
Start: 2023-08-28

## 2024-06-24 RX ORDER — CLASCOTERONE 1 G/100G
CREAM TOPICAL
COMMUNITY
Start: 2024-04-19

## 2024-06-24 RX ORDER — SULFACETAMIDE SODIUM, SULFUR 100; 50 MG/G; MG/G
EMULSION TOPICAL
COMMUNITY
Start: 2024-04-19

## 2024-06-24 RX ADMIN — GADOBUTROL 5 ML: 604.72 INJECTION INTRAVENOUS at 10:07

## 2024-06-24 ASSESSMENT — PAIN SCALES - GENERAL: PAINLEVEL: NO PAIN (0)

## 2024-06-24 NOTE — LETTER
6/24/2024      Dahiana Puentes  [address] [address]      Dear Colleague,    Thank you for referring your patient, Dahiana Puentes, to the [facility]. Please see a copy of my visit note below.    [facility]  GASTROENTEROLOGY PROGRESS NOTE  Dahiana Puentes [id number]     SUBJECTIVE:  24 yo female being followed for an incidentally identified pancreatic cyst.  Last seen 3/21/22. First seen 5/25/16.      She underwent EUS 6/9/16 with fluid aspiration. This showed a 16 x 6 mm cyst in the pancreatic head without high-risk features. Fluid aspiration showed CEA which was low at 2.5, amlase intermediate at 698 and cytology was benign. She has been having surveillance via MRIs, without concerning features. MRIs have also found liver findings thought suggestive of FNH. This was reviewed at hepatology conference and not felt to require specific further liver surveillance.    She has been doing well. She has issues with intermittent loose stools. One a day without blood or visible fat/oil. Fecal elastase was normal. This improves with imodium. She has been evaluated by GI at [facility]. She is now starting holistic therapy with a variety of supplements. She does not have abdominal pain.  She had lost 4-5 lb during recovery from a tonsillectomy, but now is back at baseline of 108 lb.     OBJECTIVE:  VS: BP 99/65 (BP Location: Right arm, Patient Position: Sitting, Cuff Size: Adult Small)   Pulse 86   Temp 98.9  F (37.2  C) (Oral)   Resp 20   Wt 49.2 kg (108 lb 8 oz)   SpO2 100%   BMI 17.51 kg/m     GEN: A&Ox3, NAD, comfortable  CV:  RRR, no M/G/R  PULM:  CTAB  ABD: normal BS, soft, NT/ND    MRI has not yet been read. By my review, I suspect that the cyst has significantly enlarged. By my measurements in similar planes, this was 21 x 10 mm in 2022 and now 28 x 10 mm. I do not see enhancing mural nodules or main duct dilation.    IMPRESSION:  Dahiana Puentes is a  23 year old female with an incidentally-identified pancreatic cyst.  Low CEA reassuring but not definitive for a benign, non-mucinous cyst, however cannot entirely exclude a mucinous cystic neoplasm.   Definitive treatment would require Whipple resection, which I do not currently believe is indicated.    I had been considering cessation of surveillance given the duration of surveillance, however now given the enlargement (if confirmed on official read), I believe this will need repeat EUS based on consensus guidelines. If this is pursued, I will send PancraGen testing.    RECOMMENDATIONS:  Await official read of MRI. If confirmed to have enlarged at least 2.5 mm per year will pursue repeat EUS and FNA.    It was a pleasure to participate in the care of this patient; please contact us with any further questions.  A total of 35 minutes was spent on the day of the visit, >50% of which was counseling regarding the above delineated issues. The remainder was review of records and imaging as well as documentation and coordination of care.    This pt was seen in conjunction with Dr. Jessica Bennett MD, PGY1.          Again, thank you for allowing me to participate in the care of your patient.      Sincerely,    Raul Rojo MD

## 2024-06-24 NOTE — PROGRESS NOTES
[facility]  GASTROENTEROLOGY PROGRESS NOTE  Dahiana Puentes [id number]     SUBJECTIVE:  22 yo female being followed for an incidentally identified pancreatic cyst.  Last seen 3/21/22. First seen 5/25/16.      She underwent EUS 6/9/16 with fluid aspiration. This showed a 16 x 6 mm cyst in the pancreatic head without high-risk features. Fluid aspiration showed CEA which was low at 2.5, amlase intermediate at 698 and cytology was benign. She has been having surveillance via MRIs, without concerning features. MRIs have also found liver findings thought suggestive of FNH. This was reviewed at hepatology conference and not felt to require specific further liver surveillance.    She has been doing well. She has issues with intermittent loose stools. One a day without blood or visible fat/oil. Fecal elastase was normal. This improves with imodium. She has been evaluated by GI at [facility]. She is now starting holistic therapy with a variety of supplements. She does not have abdominal pain.  She had lost 4-5 lb during recovery from a tonsillectomy, but now is back at baseline of 108 lb.     OBJECTIVE:  VS: BP 99/65 (BP Location: Right arm, Patient Position: Sitting, Cuff Size: Adult Small)   Pulse 86   Temp 98.9  F (37.2  C) (Oral)   Resp 20   Wt 49.2 kg (108 lb 8 oz)   SpO2 100%   BMI 17.51 kg/m     GEN: A&Ox3, NAD, comfortable  CV:  RRR, no M/G/R  PULM:  CTAB  ABD: normal BS, soft, NT/ND    MRI has not yet been read. By my review, I suspect that the cyst has significantly enlarged. By my measurements in similar planes, this was 21 x 10 mm in 2022 and now 28 x 10 mm. I do not see enhancing mural nodules or main duct dilation.    IMPRESSION:  Dahiana Puentes is a 23 year old female with an incidentally-identified pancreatic cyst.  Low CEA reassuring but not definitive for a benign, non-mucinous cyst, however cannot entirely exclude a mucinous cystic neoplasm.   Definitive treatment would require Whipple  resection, which I do not currently believe is indicated.    I had been considering cessation of surveillance given the duration of surveillance, however now given the enlargement (if confirmed on official read), I believe this will need repeat EUS based on consensus guidelines. If this is pursued, I will send PancraGen testing.    RECOMMENDATIONS:  Await official read of MRI. If confirmed to have enlarged at least 2.5 mm per year will pursue repeat EUS and FNA.    It was a pleasure to participate in the care of this patient; please contact us with any further questions.  A total of 35 minutes was spent on the day of the visit, >50% of which was counseling regarding the above delineated issues. The remainder was review of records and imaging as well as documentation and coordination of care.    This pt was seen in conjunction with Dr. Jessica Bennett MD, PGY1.    SOFIA Rojo MD  Professor of Medicine  Division of Gastroenterology, Hepatology and Nutrition  [facility]  6/24/2024

## 2024-06-24 NOTE — NURSING NOTE
"Oncology Rooming Note    June 24, 2024 11:24 AM   Dahiana Puentes is a 23 year old female who presents for:    Chief Complaint   Patient presents with    Oncology Clinic Visit     Pancreas cyst     Initial Vitals: BP 99/65 (BP Location: Right arm, Patient Position: Sitting, Cuff Size: Adult Small)   Pulse 86   Temp 98.9  F (37.2  C) (Oral)   Resp 20   Wt 49.2 kg (108 lb 8 oz)   SpO2 100%   BMI 17.51 kg/m   Estimated body mass index is 17.51 kg/m  as calculated from the following:    Height as of 6/12/19: 1.676 m (5' 6\").    Weight as of this encounter: 49.2 kg (108 lb 8 oz). Body surface area is 1.51 meters squared.  No Pain (0) Comment: Data Unavailable   No LMP recorded.  Allergies reviewed: Yes  Medications reviewed: Yes    Medications: Medication refills not needed today.  Pharmacy name entered into FRAM[facility]: [facility] [address] [address]    Frailty Screening:   Is the patient here for a new oncology consult visit in cancer care? 2. No      Clinical concerns: none      Sharita Kothari, EMT  6/24/2024            "

## 2024-06-29 NOTE — PATIENT INSTRUCTIONS
You will find a brief summary of your discussion and care plan from today's visit below.  Dr Rojo has outlined the following steps after your recent clinic visit:    RECOMMENDATIONS:  Await official read of MRI. If confirmed to have enlarged at least 2.5 mm per year will pursue repeat EUS and FNA.    Please call with any questions or concerns regarding your clinic visit today.     It is a pleasure being involved in your health care.     Contacts post-consultation depending on your need:     Schedule Clinic Appointments                        [phone], option 1    Cata Perez RN Care Coordinator           [phone]     León Rhoades OR                           [phone]     GI Procedure Scheduling                               [phone], option 2     For urgent/emergent questions after business hours, you may reach the on-call GI Fellow by contacting the [facility]  at [phone].    How to I schedule a follow-up visit?  If you did not schedule a follow-up visit today, please call [phone] option #1 to schedule a follow-up office visit.       How do I schedule labs, imaging studies, or procedures that were ordered in clinic today?      Labs: To schedule lab appointment at the [facility] and [facility], use my chart or call [phone]. If you have a [place] lab closer to home where you are regularly seen you can give them a call.      Procedures: If a colonoscopy, upper endoscopy, breath test, esophageal manometry, or pH impedence was ordered today, our endoscopy team will call you to schedule this. If you have not heard from our endoscopy team within a week, please call [phone] to schedule.      Imaging Studies: If you were scheduled for a CT scan, X-ray, MRI, ultrasound, HIDA scan or other imaging study, please call [phone] to have this scheduled.      Referral: If a referral to another specialty was ordered, expect a phone call or follow  instructions above. If you have not heard from anyone regarding your referral in a week, please call our clinic to check the status.     I recommend signing up for [employer] access if you have not already done so and are comfortable with using a computer.  This allows for online access to your lab results and also helps you communicate efficiently with the clinic should any questions arise in your care.

## 2024-07-03 ENCOUNTER — PATIENT OUTREACH (OUTPATIENT)
Dept: GASTROENTEROLOGY | Facility: CLINIC | Age: 24
End: 2024-07-03
Payer: COMMERCIAL

## 2024-07-03 ENCOUNTER — NURSE TRIAGE (OUTPATIENT)
Dept: GASTROENTEROLOGY | Facility: CLINIC | Age: 24
End: 2024-07-03
Payer: COMMERCIAL

## 2024-07-03 DIAGNOSIS — K86.2 PANCREAS CYST: Primary | ICD-10-CM

## 2024-07-03 NOTE — TELEPHONE ENCOUNTER
Last Saturday, 6/29, sx fatigue and low grade fever of 99.6, brain fog.  Saw PCP on Friday, thought viral infection, so advised rest.  Nasal congestion and pressure developed over the weekend.  Labs done yesterday, results today showed elevated liver tests and she wanted Dr. Ledezma to know about them in case he thought they could be related to what he is treating her for.:  AST/SGOT: 322  Alk Phos: 409  ALTV/SGPT: 652    Has been monitoring the pancreatic cyst and wanted to let him know that this came up because she has had spots on liver (per MRI) in the past.     Informed pt that information would be sent to GI Care Team and they would follow up with her.    Routed to Care Team.

## 2024-07-03 NOTE — TELEPHONE ENCOUNTER
Called pt in response to triage nurse call, elevated LFT's and low grade fevers. Was seen at the [facility] in [place], took a mono test which is pending but the provider does think it is likely viral infection. Pt will continue to monitor for any GI symptoms, currently not having and abd pain or nausea/vomiting.   Will review MRI with Dr Rojo and relay recommendations for next steps with patient on Monday. Did advise pt to come to the ED over the holiday weekend if abdominal symptoms present.     Gisella Perez, RN, BSN,   [facility]  Care coordinator

## 2024-07-08 NOTE — TELEPHONE ENCOUNTER
Called pt to follow up on Dr Rojo's review of symptoms and MRI results:    1) Please follow-up with her re current issue not felt likely related to the pancreatic cyst or previously known liver findings on MRI.   2) Confirm that she is not taking tylenol   3) recommend further follow-up through primary MD. Contact us if not resolving and no source found.   4) Schedule EUS as below.     Please assist in scheduling:     Procedure/Imaging/Clinic: Upper EUS (with PancraGen testing)   Physician: Darrel   Timing: in the next 3 months   Scope time needed:Standard for site   Anesthesia:MAC   Dx: Enlarging pancreatic cyst   Tier:Tier 3 -   Surgeries/procedures that can be delayed  between 30 to 90 days with no significant  morbidity/mortality to patient or impact on  patient/disease outcome.   Location: [facility]     Current symptoms are improved, headaches off and on and still tired but much improved since symptoms started. She is using Ibuprofen not tylenol and discussed to not take tylenol while LFT's are elevated. Follow up visit is scheduled with her PCP with labs , did ask that these results be faxed to our office to upload in her chart.     Gisella Perez, RN, BSN,   [facility]  Care coordinator

## 2024-07-12 ENCOUNTER — TELEPHONE (OUTPATIENT)
Dept: GASTROENTEROLOGY | Facility: CLINIC | Age: 24
End: 2024-07-12
Payer: COMMERCIAL

## 2024-07-12 NOTE — TELEPHONE ENCOUNTER
"Endoscopy Scheduling Screen    Have you had a positive Covid test in the last 14 days?  No      What is your communication preference for Instructions and/or Bowel Prep?   MyChart      What insurance is in the chart?  Other:  bcbs    Ordering/Referring Provider: DEEP SYKES   (If ordering provider performs procedure, schedule with ordering provider unless otherwise instructed. )    BMI: Estimated body mass index is 17.51 kg/m  as calculated from the following:    Height as of 6/12/19: 1.676 m (5' 6\").    Weight as of 6/24/24: 49.2 kg (108 lb 8 oz).     Sedation Ordered  MAC/deep sedation.   BMI<= 45 45 < BMI <= 48 48 < BMI < = 50  BMI > 50   No Restrictions No [facility]  No [facility]  [facility] with exceptions Hospital Only OR Only         Do you have a history of malignant hyperthermia?  No      (Females) Are you currently pregnant?   No       Have you been diagnosed or told you have pulmonary hypertension?   No      Do you have an LVAD?  No      Have you been told you have moderate to severe sleep apnea?  No    Have you been told you have COPD, asthma, or any other lung disease?  No      Do you have any heart conditions?  No       Have you ever had or are you waiting for an organ transplant?  No. Continue scheduling, no site restrictions.      Have you had a stroke or transient ischemic attack (TIA aka \"mini stroke\" in the last 6 months?   No      Have you been diagnosed with or been told you have cirrhosis of the liver?   No      Are you currently on dialysis?   No      Do you need assistance transferring?   No    BMI: Estimated body mass index is 17.51 kg/m  as calculated from the following:    Height as of 6/12/19: 1.676 m (5' 6\").    Weight as of 6/24/24: 49.2 kg (108 lb 8 oz).     Is patients BMI > 40 and scheduling location [facility]?  No      Do you take an injectable medication for weight loss or diabetes (excluding insulin)?  No    Do you take the medication Naltrexone?  No    Do you take blood thinners?  No "       Prep   Are you currently on dialysis or do you have chronic kidney disease?  No    Do you have a diagnosis of diabetes?  No    Do you have a diagnosis of cystic fibrosis (CF)?  No    On a regular basis do you go 3 -5 days between bowel movements?  N/a    BMI > 40?  No    Preferred Pharmacy:    [facility] - [place] - [address]  [address]  [place] [address]  Phone: [phone] Fax: [phone]      Final Scheduling Details     Procedure scheduled  Endoscopic ultrasound (EUS) - upper    Surgeon:  ARJUN     Date of procedure:  8/13/2024     Pre-OP / PAC:   No - Not required for this site.    Location  [facility] - Per order.    Sedation   MAC/Deep Sedation - Per order.      Patient Reminders:   You will receive a call from a Nurse to review instructions and health history.  This assessment must be completed prior to your procedure.  Failure to complete the Nurse assessment may result in the procedure being cancelled.      On the day of your procedure, please designate an adult(s) who can drive you home stay with you for the next 24 hours. The medicines used in the exam will make you sleepy. You will not be able to drive.      You cannot take public transportation, ride share services, or non-medical taxi service without a responsible caregiver.  Medical transport services are allowed with the requirement that a responsible caregiver will receive you at your destination.  We require that drivers and caregivers are confirmed prior to your procedure.

## 2024-07-13 ENCOUNTER — HEALTH MAINTENANCE LETTER (OUTPATIENT)
Age: 24
End: 2024-07-13

## 2024-07-16 ENCOUNTER — TRANSFERRED RECORDS (OUTPATIENT)
Dept: HEALTH INFORMATION MANAGEMENT | Facility: CLINIC | Age: 24
End: 2024-07-16
Payer: COMMERCIAL

## 2024-08-05 ENCOUNTER — TELEPHONE (OUTPATIENT)
Dept: GASTROENTEROLOGY | Facility: CLINIC | Age: 24
End: 2024-08-05
Payer: COMMERCIAL

## 2024-08-05 NOTE — TELEPHONE ENCOUNTER
Pre visit planning completed.      Procedure details:    Patient scheduled for Endoscopic ultrasound (EUS) on 8/13/2024.     Arrival time: 0730. Procedure time 0900    Facility location: [facility]; [address] [place] [address], [address]rd Floor, [place] [address]. Check in location: Main entrance at registration desk.    Sedation type: MAC    Pre op exam needed? No.    Indication for procedure: Pancreas cyst [K86.2]       Chart review:     Electronic implanted devices? No    Recent diagnosis of diverticulitis within the last 6 weeks? N/A      Medication review:    Diabetic? No    Anticoagulants? No    Weight loss medication/injectable? No.    NSAIDS? No NSAID medications per patient's medication list.  RN will verify with pre-assessment call.    Other medication HOLDING recommendations:  N/A      Prep for procedure:     Prep instructions sent via [email or web address]         Iris Brooks RN  Endoscopy Procedure Pre Assessment RN  [phone] option 4

## 2024-08-05 NOTE — TELEPHONE ENCOUNTER
Attempted to contact patient in order to complete pre assessment questions.     No answer. Left message to return call to [phone] option 4    Callback required communication sent via [employer].    Iris Brooks RN  Endoscopy Procedure Pre Assessment

## 2024-08-07 NOTE — TELEPHONE ENCOUNTER
Pre assessment completed for upcoming procedure.   (Please see previous telephone encounter notes for complete details)    Patient  returned call.       Procedure details:    Arrival time and facility location reviewed.    Pre op exam needed? No.    Designated  policy reviewed. Instructed to have someone stay 24  hours post procedure.       Medication review:    Medications reviewed. Please see supporting documentation below. Holding recommendations discussed (if applicable).       Prep for procedure:     Procedure prep instructions reviewed.        Any additional information needed:  N/A      Patient  verbalized understanding and had no questions or concerns at this time.      Winifred Lainez RN  Endoscopy Procedure Pre Assessment   [phone] option 4

## 2024-08-13 ENCOUNTER — ANESTHESIA EVENT (OUTPATIENT)
Dept: GASTROENTEROLOGY | Facility: CLINIC | Age: 24
End: 2024-08-13
Payer: COMMERCIAL

## 2024-08-13 ENCOUNTER — ANESTHESIA (OUTPATIENT)
Dept: GASTROENTEROLOGY | Facility: CLINIC | Age: 24
End: 2024-08-13
Payer: COMMERCIAL

## 2024-08-13 ENCOUNTER — HOSPITAL ENCOUNTER (OUTPATIENT)
Facility: CLINIC | Age: 24
Discharge: HOME | End: 2024-08-13
Attending: INTERNAL MEDICINE | Admitting: INTERNAL MEDICINE
Payer: COMMERCIAL

## 2024-08-13 VITALS
OXYGEN SATURATION: 95 % | RESPIRATION RATE: 14 BRPM | HEART RATE: 72 BPM | SYSTOLIC BLOOD PRESSURE: 99 MMHG | DIASTOLIC BLOOD PRESSURE: 68 MMHG | TEMPERATURE: 98.7 F

## 2024-08-13 DIAGNOSIS — Z79.2 NEED FOR ANTIBIOTIC PROPHYLAXIS FOR SURGICAL PROCEDURE: Primary | ICD-10-CM

## 2024-08-13 PROCEDURE — 43238 EGD US FINE NEEDLE BX/ASPIR: CPT | Performed by: ANESTHESIOLOGY

## 2024-08-13 PROCEDURE — 250N000009 HC RX 250: Performed by: NURSE ANESTHETIST, CERTIFIED REGISTERED

## 2024-08-13 PROCEDURE — 43238 EGD US FINE NEEDLE BX/ASPIR: CPT | Performed by: NURSE ANESTHETIST, CERTIFIED REGISTERED

## 2024-08-13 PROCEDURE — 43238 EGD US FINE NEEDLE BX/ASPIR: CPT | Performed by: INTERNAL MEDICINE

## 2024-08-13 PROCEDURE — 258N000003 HC RX IP 258 OP 636: Performed by: NURSE ANESTHETIST, CERTIFIED REGISTERED

## 2024-08-13 PROCEDURE — 370N000017 HC ANESTHESIA TECHNICAL FEE, PER MIN: Performed by: INTERNAL MEDICINE

## 2024-08-13 PROCEDURE — 250N000011 HC RX IP 250 OP 636: Performed by: NURSE ANESTHETIST, CERTIFIED REGISTERED

## 2024-08-13 PROCEDURE — 43242 EGD US FINE NEEDLE BX/ASPIR: CPT | Performed by: INTERNAL MEDICINE

## 2024-08-13 RX ORDER — FENTANYL CITRATE 50 UG/ML
25 INJECTION, SOLUTION INTRAMUSCULAR; INTRAVENOUS EVERY 5 MIN PRN
Status: DISCONTINUED | OUTPATIENT
Start: 2024-08-13 | End: 2024-08-13 | Stop reason: HOSPADM

## 2024-08-13 RX ORDER — ONDANSETRON 2 MG/ML
INJECTION INTRAMUSCULAR; INTRAVENOUS PRN
Status: DISCONTINUED | OUTPATIENT
Start: 2024-08-13 | End: 2024-08-13

## 2024-08-13 RX ORDER — NALOXONE HYDROCHLORIDE 0.4 MG/ML
0.1 INJECTION, SOLUTION INTRAMUSCULAR; INTRAVENOUS; SUBCUTANEOUS
Status: DISCONTINUED | OUTPATIENT
Start: 2024-08-13 | End: 2024-08-13 | Stop reason: HOSPADM

## 2024-08-13 RX ORDER — ONDANSETRON 2 MG/ML
4 INJECTION INTRAMUSCULAR; INTRAVENOUS EVERY 30 MIN PRN
Status: DISCONTINUED | OUTPATIENT
Start: 2024-08-13 | End: 2024-08-13 | Stop reason: HOSPADM

## 2024-08-13 RX ORDER — KETAMINE HYDROCHLORIDE 10 MG/ML
INJECTION INTRAMUSCULAR; INTRAVENOUS PRN
Status: DISCONTINUED | OUTPATIENT
Start: 2024-08-13 | End: 2024-08-13

## 2024-08-13 RX ORDER — SODIUM CHLORIDE, SODIUM LACTATE, POTASSIUM CHLORIDE, CALCIUM CHLORIDE 600; 310; 30; 20 MG/100ML; MG/100ML; MG/100ML; MG/100ML
INJECTION, SOLUTION INTRAVENOUS CONTINUOUS PRN
Status: DISCONTINUED | OUTPATIENT
Start: 2024-08-13 | End: 2024-08-13

## 2024-08-13 RX ORDER — FENTANYL CITRATE 50 UG/ML
50 INJECTION, SOLUTION INTRAMUSCULAR; INTRAVENOUS EVERY 5 MIN PRN
Status: DISCONTINUED | OUTPATIENT
Start: 2024-08-13 | End: 2024-08-13 | Stop reason: HOSPADM

## 2024-08-13 RX ORDER — GLYCOPYRROLATE 0.2 MG/ML
INJECTION, SOLUTION INTRAMUSCULAR; INTRAVENOUS PRN
Status: DISCONTINUED | OUTPATIENT
Start: 2024-08-13 | End: 2024-08-13

## 2024-08-13 RX ORDER — LIDOCAINE 40 MG/G
CREAM TOPICAL
Status: DISCONTINUED | OUTPATIENT
Start: 2024-08-13 | End: 2024-08-13 | Stop reason: HOSPADM

## 2024-08-13 RX ORDER — OXYCODONE HYDROCHLORIDE 5 MG/1
5 TABLET ORAL
Status: DISCONTINUED | OUTPATIENT
Start: 2024-08-13 | End: 2024-08-13 | Stop reason: HOSPADM

## 2024-08-13 RX ORDER — ONDANSETRON 2 MG/ML
4 INJECTION INTRAMUSCULAR; INTRAVENOUS EVERY 6 HOURS PRN
Status: DISCONTINUED | OUTPATIENT
Start: 2024-08-13 | End: 2024-08-13 | Stop reason: HOSPADM

## 2024-08-13 RX ORDER — ACETAMINOPHEN 500 MG
500-1000 TABLET ORAL EVERY 6 HOURS PRN
COMMUNITY

## 2024-08-13 RX ORDER — ONDANSETRON 2 MG/ML
4 INJECTION INTRAMUSCULAR; INTRAVENOUS
Status: DISCONTINUED | OUTPATIENT
Start: 2024-08-13 | End: 2024-08-13 | Stop reason: HOSPADM

## 2024-08-13 RX ORDER — DEXAMETHASONE SODIUM PHOSPHATE 4 MG/ML
4 INJECTION, SOLUTION INTRA-ARTICULAR; INTRALESIONAL; INTRAMUSCULAR; INTRAVENOUS; SOFT TISSUE
Status: DISCONTINUED | OUTPATIENT
Start: 2024-08-13 | End: 2024-08-13 | Stop reason: HOSPADM

## 2024-08-13 RX ORDER — OXYCODONE HYDROCHLORIDE 10 MG/1
10 TABLET ORAL
Status: DISCONTINUED | OUTPATIENT
Start: 2024-08-13 | End: 2024-08-13 | Stop reason: HOSPADM

## 2024-08-13 RX ORDER — NALOXONE HYDROCHLORIDE 0.4 MG/ML
0.4 INJECTION, SOLUTION INTRAMUSCULAR; INTRAVENOUS; SUBCUTANEOUS
Status: DISCONTINUED | OUTPATIENT
Start: 2024-08-13 | End: 2024-08-13 | Stop reason: HOSPADM

## 2024-08-13 RX ORDER — LIDOCAINE HYDROCHLORIDE 20 MG/ML
INJECTION, SOLUTION INFILTRATION; PERINEURAL PRN
Status: DISCONTINUED | OUTPATIENT
Start: 2024-08-13 | End: 2024-08-13

## 2024-08-13 RX ORDER — SODIUM CHLORIDE, SODIUM LACTATE, POTASSIUM CHLORIDE, CALCIUM CHLORIDE 600; 310; 30; 20 MG/100ML; MG/100ML; MG/100ML; MG/100ML
INJECTION, SOLUTION INTRAVENOUS CONTINUOUS
Status: DISCONTINUED | OUTPATIENT
Start: 2024-08-13 | End: 2024-08-13 | Stop reason: HOSPADM

## 2024-08-13 RX ORDER — NALOXONE HYDROCHLORIDE 0.4 MG/ML
0.2 INJECTION, SOLUTION INTRAMUSCULAR; INTRAVENOUS; SUBCUTANEOUS
Status: DISCONTINUED | OUTPATIENT
Start: 2024-08-13 | End: 2024-08-13 | Stop reason: HOSPADM

## 2024-08-13 RX ORDER — MEPERIDINE HYDROCHLORIDE 25 MG/ML
12.5 INJECTION INTRAMUSCULAR; INTRAVENOUS; SUBCUTANEOUS ONCE
Status: DISCONTINUED | OUTPATIENT
Start: 2024-08-13 | End: 2024-08-13

## 2024-08-13 RX ORDER — FLUMAZENIL 0.1 MG/ML
0.2 INJECTION, SOLUTION INTRAVENOUS
Status: DISCONTINUED | OUTPATIENT
Start: 2024-08-13 | End: 2024-08-13 | Stop reason: HOSPADM

## 2024-08-13 RX ORDER — FENTANYL CITRATE 50 UG/ML
INJECTION, SOLUTION INTRAMUSCULAR; INTRAVENOUS PRN
Status: DISCONTINUED | OUTPATIENT
Start: 2024-08-13 | End: 2024-08-13

## 2024-08-13 RX ORDER — ONDANSETRON 4 MG/1
4 TABLET, ORALLY DISINTEGRATING ORAL EVERY 30 MIN PRN
Status: DISCONTINUED | OUTPATIENT
Start: 2024-08-13 | End: 2024-08-13 | Stop reason: HOSPADM

## 2024-08-13 RX ORDER — PROPOFOL 10 MG/ML
INJECTION, EMULSION INTRAVENOUS CONTINUOUS PRN
Status: DISCONTINUED | OUTPATIENT
Start: 2024-08-13 | End: 2024-08-13

## 2024-08-13 RX ORDER — HYDROMORPHONE HCL IN WATER/PF 6 MG/30 ML
0.2 PATIENT CONTROLLED ANALGESIA SYRINGE INTRAVENOUS EVERY 5 MIN PRN
Status: DISCONTINUED | OUTPATIENT
Start: 2024-08-13 | End: 2024-08-13 | Stop reason: HOSPADM

## 2024-08-13 RX ORDER — LEVOFLOXACIN 500 MG/1
500 TABLET, FILM COATED ORAL DAILY
Qty: 5 TABLET | Refills: 0 | Status: SHIPPED | OUTPATIENT
Start: 2024-08-13 | End: 2024-08-18

## 2024-08-13 RX ORDER — ONDANSETRON 4 MG/1
4 TABLET, ORALLY DISINTEGRATING ORAL EVERY 6 HOURS PRN
Status: DISCONTINUED | OUTPATIENT
Start: 2024-08-13 | End: 2024-08-13 | Stop reason: HOSPADM

## 2024-08-13 RX ORDER — HYDROMORPHONE HCL IN WATER/PF 6 MG/30 ML
0.4 PATIENT CONTROLLED ANALGESIA SYRINGE INTRAVENOUS EVERY 5 MIN PRN
Status: DISCONTINUED | OUTPATIENT
Start: 2024-08-13 | End: 2024-08-13 | Stop reason: HOSPADM

## 2024-08-13 RX ORDER — LEVOFLOXACIN 5 MG/ML
INJECTION, SOLUTION INTRAVENOUS PRN
Status: DISCONTINUED | OUTPATIENT
Start: 2024-08-13 | End: 2024-08-13

## 2024-08-13 RX ADMIN — SODIUM CHLORIDE, POTASSIUM CHLORIDE, SODIUM LACTATE AND CALCIUM CHLORIDE: 600; 310; 30; 20 INJECTION, SOLUTION INTRAVENOUS at 08:58

## 2024-08-13 RX ADMIN — DEXMEDETOMIDINE HYDROCHLORIDE 4 MCG: 100 INJECTION, SOLUTION INTRAVENOUS at 10:00

## 2024-08-13 RX ADMIN — LEVOFLOXACIN 500 MG: 5 INJECTION, SOLUTION INTRAVENOUS at 09:11

## 2024-08-13 RX ADMIN — Medication 10 MG: at 09:33

## 2024-08-13 RX ADMIN — PROPOFOL 10 MG: 10 INJECTION, EMULSION INTRAVENOUS at 09:11

## 2024-08-13 RX ADMIN — MIDAZOLAM 2 MG: 1 INJECTION INTRAMUSCULAR; INTRAVENOUS at 08:58

## 2024-08-13 RX ADMIN — DEXMEDETOMIDINE HYDROCHLORIDE 4 MCG: 100 INJECTION, SOLUTION INTRAVENOUS at 09:47

## 2024-08-13 RX ADMIN — ONDANSETRON 4 MG: 2 INJECTION INTRAMUSCULAR; INTRAVENOUS at 09:06

## 2024-08-13 RX ADMIN — FENTANYL CITRATE 25 MCG: 50 INJECTION INTRAMUSCULAR; INTRAVENOUS at 09:06

## 2024-08-13 RX ADMIN — DEXMEDETOMIDINE HYDROCHLORIDE 4 MCG: 100 INJECTION, SOLUTION INTRAVENOUS at 09:50

## 2024-08-13 RX ADMIN — Medication 10 MG: at 09:24

## 2024-08-13 RX ADMIN — GLYCOPYRROLATE 0.1 MG: 0.2 INJECTION, SOLUTION INTRAMUSCULAR; INTRAVENOUS at 09:24

## 2024-08-13 RX ADMIN — PROPOFOL 20 MG: 10 INJECTION, EMULSION INTRAVENOUS at 09:15

## 2024-08-13 RX ADMIN — LIDOCAINE HYDROCHLORIDE 50 MG: 20 INJECTION, SOLUTION INFILTRATION; PERINEURAL at 09:06

## 2024-08-13 RX ADMIN — PROPOFOL 125 MCG/KG/MIN: 10 INJECTION, EMULSION INTRAVENOUS at 09:06

## 2024-08-13 RX ADMIN — PROPOFOL 10 MG: 10 INJECTION, EMULSION INTRAVENOUS at 09:07

## 2024-08-13 RX ADMIN — TOPICAL ANESTHETIC 1 SPRAY: 200 SPRAY DENTAL; PERIODONTAL at 08:58

## 2024-08-13 ASSESSMENT — ACTIVITIES OF DAILY LIVING (ADL)
ADLS_ACUITY_SCORE: 35

## 2024-08-13 NOTE — DISCHARGE INSTRUCTIONS
Discharge Instructions after Endoscopic Ultrasound    Activity  You were given medicine for pain. You may be dizzy or sleepy.    For 24 hours:  Do not drive or use heavy equipment.  Do not make important decisions.  Do not drink any alcohol.    Diet  Wait one hour before eating or drinking. Start with sips of water. When your gag reflex has returned you  may go back to your usual diet, medicines and light exercise.    Discomfort  Some bloating is normal. You may have large burps or pass air.  You may have a sore throat for 2 to 3 days. It may help to:  Avoid hot liquids for 24 hours.  Use sore throat lozenges.  Gargle as needed with salt water up to 4 times a day. Mix 1 cup of warm water with 1 teaspoon of salt. Do not swallow.  You may take Tylenol (acetaminophen) for pain unless your doctor has told you not to.    Do not take aspirin or ibuprofen (Advil, Motrin, or other anti-inflammatory  drugs) for 3 days.    Follow-up  We took small tissue or fluid samples to study. We will call you with the results in about 10 working days.    When to call    Call right away if you have:  Severe throat pain or trouble swallowing  Black stools (tar-like looking bowel movement)  Fever above 100.6 F (37.5 C)  Unusual pain in belly or chest not relieved by belching or passing air.    If you vomit blood or have severe pain, go to an emergency room.    If you have questions, call    Monday to Friday, 8 a.m. to 4:30 p.m.:   Central Scheduling Department: [phone]  After hours: Hospital: [phone] (Ask for the GI fellow on call)

## 2024-08-13 NOTE — H&P
Gastroenterology Pre-op History and Physical    Dahiana Puentes MRN# [id number]   Age: 23 year old YOB: 2000      Date of Surgery: 08/13/24  [facility]      Date of Exam 8/13/2024 Facility Same Day       Primary care provider: Winifred Gregory         Chief Complaint and/or Reason for Procedure:     23 year old female with an incidentally-identified pancreatic head cyst, who was noted to have enlarging cyst on MRI. She underwent EUS in 6/2016 that showed 16 x 6 mm cyst in the pancreatic head without high risk features s/p FNA (CEA 2.5 and amylase 698). MRI on 6/24/24 noted enlarged cyst in the HOP measuring up to 2.6 cm compared to 2.1 in 3/2022.            Past Medical and Surgical History:     Past Medical History:   Diagnosis Date    Allergic rhinitis     Anxiety     Chronic diarrhea     Cyst of pancreas     Dysphagia     Esophageal reflux     Heart palpitations     Other bladder disorder     Scoliosis     Stomach problems      Past Surgical History:   Procedure Laterality Date    BIOPSY      COLONOSCOPY Left 1/23/2019    Procedure: EGD;  Surgeon: Raul Rojo MD;  Location: [facility]    COLONOSCOPY N/A 5/14/2019    Procedure: COLONOSCOPY, WITH POLYPECTOMY AND BIOPSY;  Surgeon: Soumya Mckeon MD;  Location:  OR    ENDOSCOPIC ULTRASOUND UPPER GASTROINTESTINAL TRACT (GI) N/A 6/9/2016    Procedure: ENDOSCOPIC ULTRASOUND, ESOPHAGOSCOPY / UPPER GASTROINTESTINAL TRACT (GI);  Surgeon: Raul Rojo MD;  Location: [facility] OR            Medications (include herbals and vitamins):        Medications Prior to Admission   Medication Sig Dispense Refill Last Dose    acetaminophen (TYLENOL) 500 MG tablet Take 500-1,000 mg by mouth every 6 hours as needed for mild pain   Past Week    Cholecalciferol (VITAMIN D) 2000 UNITS CAPS Take 2,000 Units by mouth daily   Unknown    clindamycin (CLINDAMAX) 1 % gel Apply topically 2 times daily   8/12/2024     propranolol (INDERAL) 10 MG tablet TAKE 1 OR 2 TABLETS BY MOUTH ONE HOUR PRIOR TO ANXIETY PROVOKING SITUATION   Past Month    sertraline (ZOLOFT) 50 MG tablet TAKE 1 AND 1/2 TABLETS BY MOUTH ONCE DAILY (NEEDS TO BE SEEN)   8/12/2024    spironolactone (ALDACTONE) 25 MG tablet Take 25 mg by mouth 2 times daily   8/12/2024    Sulfacetamide Sodium-Sulfur 10-5 % LIQD Apply  topically. Wash face once daily.  Indications: Common Acne   8/12/2024    WINLEVI 1 % CREA Apply  topically. Apply TWICE daily to affected area: face  Indications: Common Acne   8/12/2024             Allergies:      Allergies   Allergen Reactions    Sulfacetamide Hives    Molds & Smuts Other (See Comments)    Penicillins Hives and Rash    Pollen Extract Other (See Comments)    Sulfa Antibiotics Hives and Rash               Physical Exam:   All vitals have been reviewed  Patient Vitals for the past 8 hrs:   BP Temp Pulse Resp SpO2   08/13/24 0816 -- -- 72 -- --   08/13/24 0759 108/71 98.7  F (37.1  C) -- 12 100 %     No intake/output data recorded.    General: Lying in bed, in NAD  ENT: Normocephalic, atraumatic   Lungs: Normal work of breathing   Cardiovascular: Normal rate, regular rhythm   Abdomen: Soft, non-tender             Anesthetic risk and/or ASA classification:   ASA: 1    Talia Bentley MD

## 2024-08-13 NOTE — BRIEF OP NOTE
[facility]    Brief Operative Note    Pre-operative diagnosis: Pancreas cyst [K86.2]  Post-operative diagnosis Same as pre-operative diagnosis    Procedure: Endoscopic ultrasound upper gastrointestinal tract (GI), N/A - Esophagus    Surgeon: Surgeons and Role:     * Raul Rojo MD - Primary  Anesthesia: MAC   Estimated Blood Loss: None    Drains: None  Specimens:   ID Type Source Tests Collected by Time Destination   A : buccal swab Brushing Buccal (not smear) LABORATORY MISCELLANEOUS ORDER Raul Rojo MD 8/13/2024  8:49 AM    B : pancreas cyst FNA Cyst Pancreas LABORATORY MISCELLANEOUS ORDER Raul Rojo MD 8/13/2024  9:53 AM      Findings:     - Normal EGD  - Irregular 26 x 5 mm hypoechoic cystic lesion in the head of the pancreas s/p FNA (sent for PancraGEN GNAS and CEA)     Recommendations:   - Levofloxacin 500 mg daily for 5 days   - Follow fluid analysis results   - Follow with Dr. Rojo in clinic for further management     Complications: None.  Implants: * No implants in log *

## 2024-08-13 NOTE — ANESTHESIA PREPROCEDURE EVALUATION
Anesthesia Pre-Procedure Evaluation    Patient: Dahiana Puentes   MRN: [id number] : 2000        Procedure : Procedure(s):  Endoscopic ultrasound upper gastrointestinal tract (GI)          Past Medical History:   Diagnosis Date     Allergic rhinitis      Anxiety      Chronic diarrhea      Cyst of pancreas      Dysphagia      Esophageal reflux      Heart palpitations      Other bladder disorder      Scoliosis      Stomach problems       Past Surgical History:   Procedure Laterality Date     BIOPSY       COLONOSCOPY Left 2019    Procedure: EGD;  Surgeon: Raul Rojo MD;  Location: [facility]     COLONOSCOPY N/A 2019    Procedure: COLONOSCOPY, WITH POLYPECTOMY AND BIOPSY;  Surgeon: Soumya Mckeon MD;  Location: [facility]     ENDOSCOPIC ULTRASOUND UPPER GASTROINTESTINAL TRACT (GI) N/A 2016    Procedure: ENDOSCOPIC ULTRASOUND, ESOPHAGOSCOPY / UPPER GASTROINTESTINAL TRACT (GI);  Surgeon: Raul Rojo MD;  Location: [facility] OR      Allergies   Allergen Reactions     Molds & Smuts Other (See Comments)     Penicillins Hives and Rash     Pollen Extract Other (See Comments)     Sulfa Antibiotics Hives and Rash      Social History     Tobacco Use     Smoking status: Never     Smokeless tobacco: Never   Substance Use Topics     Alcohol use: Never     Alcohol/week: 0.0 standard drinks of alcohol      Wt Readings from Last 1 Encounters:   24 49.2 kg (108 lb 8 oz)        Anesthesia Evaluation   Pt has had prior anesthetic. Type: General.        ROS/MED HX  ENT/Pulmonary:       Neurologic:       Cardiovascular:       METS/Exercise Tolerance:     Hematologic:       Musculoskeletal:       GI/Hepatic:     (+) GERD,                   Renal/Genitourinary:       Endo:       Psychiatric/Substance Use:       Infectious Disease:       Malignancy:       Other:          Physical Exam    Airway        Mallampati: I    Neck ROM: full     Respiratory Devices and Support         Dental       (+)  Minor Abnormalities - some fillings, tiny chips      Cardiovascular   cardiovascular exam normal          Pulmonary   pulmonary exam normal            OUTSIDE LABS:  CBC:   Lab Results   Component Value Date    WBC 7.7 12/17/2018    WBC 9.5 06/09/2016    HGB 14.1 12/17/2018    HGB 13.2 06/09/2016    HCT 41.8 12/17/2018    HCT 38.8 06/09/2016     12/17/2018     06/09/2016     BMP:   Lab Results   Component Value Date    BUN 12 06/09/2016     COAGS:   Lab Results   Component Value Date    INR 1.03 06/09/2016     POC:   Lab Results   Component Value Date    HCG Negative 05/14/2019     HEPATIC:   Lab Results   Component Value Date    ALBUMIN 3.8 12/17/2018     OTHER:   Lab Results   Component Value Date    LIPASE 19 09/29/2023    TSH 2.38 12/17/2018    SED 5 12/17/2018       Anesthesia Plan    ASA Status:  1    NPO Status:  NPO Appropriate    Anesthesia Type: MAC.     - Reason for MAC: straight local not clinically adequate   Induction: Intravenous.   Maintenance: TIVA.        Consents    Anesthesia Plan(s) and associated risks, benefits, and realistic alternatives discussed. Questions answered and patient/representative(s) expressed understanding.     - Discussed: Risks, Benefits and Alternatives for BOTH SEDATION and the PROCEDURE were discussed     - Discussed with:  Patient       Use of blood products discussed: Yes.     - Discussed with: Patient.     Postoperative Care    Pain management: IV analgesics.   PONV prophylaxis: Ondansetron (or other 5HT-3)     Comments:             Mk Medina MD    I have reviewed the pertinent notes and labs in the chart from the past 30 days and (re)examined the patient.  Any updates or changes from those notes are reflected in this note.

## 2024-08-13 NOTE — PROGRESS NOTES
Pre-procedure note:    23 year old female with an incidentally-identified pancreatic head cyst, who was noted to have enlarging cyst on MRI. She underwent EUS in 6/2016 that showed 16 x 6 mm cyst in the pancreatic head without high risk features s/p FNA (CEA 2.5 and amylase 698). MRI on 6/24/24 noted enlarged cyst in the HOP measuring up to 2.6 cm compared to 2.1 in 3/2022.     Ref: Winifred Gregory Kinney, Timothy P, MD

## 2024-08-14 LAB
PERFORMING LABORATORY: NORMAL
PERFORMING LABORATORY: NORMAL
SPECIMEN STATUS: NORMAL
SPECIMEN STATUS: NORMAL
TEST NAME: NORMAL
TEST NAME: NORMAL
UPPER EUS: NORMAL

## 2024-08-16 ENCOUNTER — DOCUMENTATION ONLY (OUTPATIENT)
Dept: GASTROENTEROLOGY | Facility: CLINIC | Age: 24
End: 2024-08-16
Payer: COMMERCIAL

## 2024-08-16 NOTE — CONFIDENTIAL NOTE
Faxed procedure report from 8/13/24 and facesheet to [employer] [phone]    Gisella Perez, RN, BSN,   [facility]  Care coordinator

## 2024-08-21 LAB
SCANNED LAB RESULT: NORMAL
TEST NAME: NORMAL

## 2024-08-28 LAB
SCANNED LAB RESULT: NORMAL
TEST NAME: NORMAL

## 2024-09-06 ENCOUNTER — TELEPHONE (OUTPATIENT)
Dept: GASTROENTEROLOGY | Facility: CLINIC | Age: 24
End: 2024-09-06
Payer: COMMERCIAL

## 2024-09-06 NOTE — TELEPHONE ENCOUNTER
Cata:    Please arrange for MRI abdomen with contrast in 1 yr and clinic visit 1 week after to regroup. Ind - pancreatic cyst surveillance. Clinic visit can be virtual.    SOFIA Rojo MD  Professor of Medicine  Division of Gastroenterology, Hepatology and Nutrition  [facility]

## 2025-03-10 DIAGNOSIS — K86.2 PANCREAS CYST: Primary | ICD-10-CM

## 2025-04-16 ENCOUNTER — TELEPHONE (OUTPATIENT)
Dept: GASTROENTEROLOGY | Facility: CLINIC | Age: 25
End: 2025-04-16
Payer: COMMERCIAL

## 2025-04-16 NOTE — TELEPHONE ENCOUNTER
Left Voicemail (1st Attempt) and Sent Mychart (1st Attempt) for the patient to call back and schedule the following:    Appointment type: Return - 1 yr follow up   Provider:    Return date: Sept 2025  Specialty phone number: [phone]  Additional appointment(s) needed: MRI - DUE 1 WK PRIOR TO CLINIC   Additonal Notes:

## 2025-07-19 ENCOUNTER — HEALTH MAINTENANCE LETTER (OUTPATIENT)
Age: 25
End: 2025-07-19

## (undated) RX ORDER — DIPHENHYDRAMINE HYDROCHLORIDE 50 MG/ML
INJECTION INTRAMUSCULAR; INTRAVENOUS
Status: DISPENSED
Start: 2019-01-23

## (undated) RX ORDER — FENTANYL CITRATE 50 UG/ML
INJECTION, SOLUTION INTRAMUSCULAR; INTRAVENOUS
Status: DISPENSED
Start: 2024-08-13

## (undated) RX ORDER — FENTANYL CITRATE 50 UG/ML
INJECTION, SOLUTION INTRAMUSCULAR; INTRAVENOUS
Status: DISPENSED
Start: 2019-05-14

## (undated) RX ORDER — SIMETHICONE 20 MG/.3ML
EMULSION ORAL
Status: DISPENSED
Start: 2019-01-23

## (undated) RX ORDER — DIPHENHYDRAMINE HYDROCHLORIDE 50 MG/ML
INJECTION INTRAMUSCULAR; INTRAVENOUS
Status: DISPENSED
Start: 2019-05-14

## (undated) RX ORDER — FENTANYL CITRATE 50 UG/ML
INJECTION, SOLUTION INTRAMUSCULAR; INTRAVENOUS
Status: DISPENSED
Start: 2019-01-23